# Patient Record
Sex: MALE | Race: WHITE | Employment: FULL TIME | URBAN - METROPOLITAN AREA
[De-identification: names, ages, dates, MRNs, and addresses within clinical notes are randomized per-mention and may not be internally consistent; named-entity substitution may affect disease eponyms.]

---

## 2017-09-14 ENCOUNTER — OFFICE VISIT (OUTPATIENT)
Dept: FAMILY MEDICINE CLINIC | Age: 42
End: 2017-09-14
Payer: COMMERCIAL

## 2017-09-14 VITALS
BODY MASS INDEX: 26.96 KG/M2 | HEART RATE: 61 BPM | TEMPERATURE: 98.2 F | WEIGHT: 182 LBS | SYSTOLIC BLOOD PRESSURE: 128 MMHG | HEIGHT: 69 IN | DIASTOLIC BLOOD PRESSURE: 79 MMHG | RESPIRATION RATE: 16 BRPM

## 2017-09-14 DIAGNOSIS — J30.9 ALLERGIC RHINITIS, UNSPECIFIED ALLERGIC RHINITIS TRIGGER, UNSPECIFIED RHINITIS SEASONALITY: ICD-10-CM

## 2017-09-14 DIAGNOSIS — Z76.89 ENCOUNTER TO ESTABLISH CARE WITH NEW DOCTOR: Primary | ICD-10-CM

## 2017-09-14 DIAGNOSIS — Z23 NEED FOR TDAP VACCINATION: ICD-10-CM

## 2017-09-14 DIAGNOSIS — R09.81 NASAL CONGESTION: ICD-10-CM

## 2017-09-14 DIAGNOSIS — Z13.220 SCREENING CHOLESTEROL LEVEL: ICD-10-CM

## 2017-09-14 DIAGNOSIS — Z13.1 SCREENING FOR DIABETES MELLITUS: ICD-10-CM

## 2017-09-14 DIAGNOSIS — M54.2 CERVICAL PAIN (NECK): ICD-10-CM

## 2017-09-14 PROCEDURE — 90471 IMMUNIZATION ADMIN: CPT | Performed by: NURSE PRACTITIONER

## 2017-09-14 PROCEDURE — 90715 TDAP VACCINE 7 YRS/> IM: CPT | Performed by: NURSE PRACTITIONER

## 2017-09-14 PROCEDURE — 99386 PREV VISIT NEW AGE 40-64: CPT | Performed by: NURSE PRACTITIONER

## 2017-09-14 RX ORDER — METAXALONE 800 MG/1
800 TABLET ORAL 3 TIMES DAILY
Qty: 30 TABLET | Refills: 0 | Status: SHIPPED | OUTPATIENT
Start: 2017-09-14 | End: 2017-09-24

## 2017-09-14 RX ORDER — FLUTICASONE PROPIONATE 50 MCG
1 SPRAY, SUSPENSION (ML) NASAL 2 TIMES DAILY
COMMUNITY

## 2017-09-14 RX ORDER — MELOXICAM 15 MG/1
15 TABLET ORAL DAILY
Qty: 30 TABLET | Refills: 3 | Status: SHIPPED | OUTPATIENT
Start: 2017-09-14 | End: 2018-02-13 | Stop reason: ALTCHOICE

## 2017-09-14 ASSESSMENT — ENCOUNTER SYMPTOMS
RHINORRHEA: 0
ABDOMINAL PAIN: 0
COUGH: 0
BACK PAIN: 0
CONSTIPATION: 0
DIARRHEA: 0
VOMITING: 0
NAUSEA: 0
SHORTNESS OF BREATH: 0

## 2017-09-14 ASSESSMENT — PATIENT HEALTH QUESTIONNAIRE - PHQ9
SUM OF ALL RESPONSES TO PHQ9 QUESTIONS 1 & 2: 0
SUM OF ALL RESPONSES TO PHQ QUESTIONS 1-9: 0
1. LITTLE INTEREST OR PLEASURE IN DOING THINGS: 0
2. FEELING DOWN, DEPRESSED OR HOPELESS: 0

## 2017-10-09 DIAGNOSIS — M54.2 CERVICAL PAIN (NECK): ICD-10-CM

## 2017-10-09 LAB
ALBUMIN SERPL-MCNC: 4.3 G/DL
ALP BLD-CCNC: 75 U/L
ALT SERPL-CCNC: 22 U/L
ANION GAP SERPL CALCULATED.3IONS-SCNC: NORMAL MMOL/L
AST SERPL-CCNC: 16 U/L
BILIRUB SERPL-MCNC: 1 MG/DL (ref 0.1–1.4)
BUN BLDV-MCNC: 12 MG/DL
CALCIUM SERPL-MCNC: 8.6 MG/DL
CHLORIDE BLD-SCNC: 106 MMOL/L
CHOLESTEROL, TOTAL: 131 MG/DL
CHOLESTEROL/HDL RATIO: 4.2
CO2: 27 MMOL/L
CREAT SERPL-MCNC: 0.89 MG/DL
GFR CALCULATED: >60
GLUCOSE BLD-MCNC: 89 MG/DL
HDLC SERPL-MCNC: 31 MG/DL (ref 35–70)
LDL CHOLESTEROL CALCULATED: 68 MG/DL (ref 0–160)
POTASSIUM SERPL-SCNC: 3.8 MMOL/L
SODIUM BLD-SCNC: 142 MMOL/L
TOTAL PROTEIN: 6.2
TRIGL SERPL-MCNC: 161 MG/DL
VLDLC SERPL CALC-MCNC: 32 MG/DL

## 2017-10-10 ENCOUNTER — HOSPITAL ENCOUNTER (OUTPATIENT)
Dept: PHYSICAL THERAPY | Facility: CLINIC | Age: 42
Setting detail: THERAPIES SERIES
Discharge: HOME | End: 2017-10-10
Payer: COMMERCIAL

## 2017-10-10 DIAGNOSIS — Z13.1 SCREENING FOR DIABETES MELLITUS: ICD-10-CM

## 2017-10-10 DIAGNOSIS — Z13.220 SCREENING CHOLESTEROL LEVEL: ICD-10-CM

## 2017-10-10 DIAGNOSIS — Z76.89 ENCOUNTER TO ESTABLISH CARE WITH NEW DOCTOR: ICD-10-CM

## 2017-10-10 PROBLEM — E78.6 LOW HDL (UNDER 40): Status: ACTIVE | Noted: 2017-10-10

## 2017-10-10 PROCEDURE — 97110 THERAPEUTIC EXERCISES: CPT

## 2017-10-10 PROCEDURE — 97140 MANUAL THERAPY 1/> REGIONS: CPT

## 2017-10-10 PROCEDURE — 97161 PT EVAL LOW COMPLEX 20 MIN: CPT

## 2017-10-10 NOTE — CONSULTS
Rise/Sit    []Stand    [] Walk    [x] Lying    [] Bend                             [] Valsalva    [] Other:  Sleep: [] OK    [x] Disturbed    Objective:      STRENGTH  STRENGTH  ROM    Left Right  Left Right Cervical tight   C5 Shld Abd 5 5 L1-2 Hip Flex   Flexion 55   Shld Flexion 5 5 Hip Abd   Extension WNL   Shld IR 5 5 L3-4 Knee Ext   Rotation L 50 R 50   Shld ER 5 5 L4 Ankle DF   Sidebend L 30 R 25   C6 Elb Flex 5 5 L5 EHL   Retraction    C7 Elb Ext 5 5 S1 Plant. Flex   Lumbar    C8 EPL 5 5 Abdominals   Flexion    T1 Fing Abd   Erector Spinae   Extension          Rotation L  R         Sidebend L R         UE/LE WNL WNL                                                            Post ribT4 R side, will further assess rib function as able    TESTS (+/-) LEFT RIGHT Not Tested   SLR [] sit [] supine   []   Hamstring (SLR)   []   SKTC   []   DKTC   []   Slump/Dural   []   SI JT   []   SANTOS   []   Joint Mobility   []   Cerv. Comp - - []   Cerv. Distraction - - []   Cerv. Alar/Transverse   []   Vertebral Artery   []   Adsons   []   Malva [place]   []       OBSERVATION No Deficit Deficit Not Tested Comments   Posture       Forward Head [] [x] [] min   Rounded Shoulders [] [x] [] mod   Kyphosis [] [] []    Lordosis [] [] []    Lateral Shift [] [] []    Scoliosis [] [] []    Iliac Crest [] [] []    PSIS [] [] []    ASIS [] [] []    Genu Valgus [] [] []    Genu Varus [] [] []    Genu Recurvatum [] [] []    Pronation [] [] []    Supination [] [] []    Leg Length Discrp [] [] []    Slumped Sitting [] [x] []    Palpation [] [x] [] Mod muscle tension suboccipitals, UT, levator and  SCM L>R.  Thoracic hypomobility   Sensation [] [x] [] Tingling R UE   Edema [] [] []    Neurological [] [] []          FUNCTION Normal Difficult Unable   Sitting [] [x] []   Standing [] [] []   Ambulation [] [] []   Groom/Dress [] [] []   Lift/Carry [] [] []   Stairs [] [] []   Bending [] [x] []   OH reach [] [] []   Sit to Stand [] [] [] retraction 10x2 3-5 sec    Prone scap depression 10x2 3-5 sec          Cervical retraction 10x 10 sec supine   UT/levator stretch 3x 15 sec    Scalene stretch 3x 15 sec          Seated scap retraction 10x2 3-5 sec Elbows at 90°         Other: Instructed pt in therex per log, proper posture/positioning and use of 2 tennis balls in a sock for self SOR/TP for home. Issued HO for HEP. Pt has a rowing machine and free wts at home. Specific Instructions for next treatment:    Treatment Charges: Mins Units   [x] Evaluation       []  Low       []  Moderate       []  High 25 1   []  Modalities     [x]  Ther Exercise 20 2   [x]  Manual Therapy 20 1   []  Ther Activities     []  Aquatics     []  Vasocompression     []  Other       TOTAL TREATMENT TIME: 65    Time in: 0930   Time out: 1040    Electronically signed by: Zakiya Alamo PT        Physician Signature:________________________________Date:__________________  By signing above or cosigning this note, I have reviewed this plan of care and certify a need for medically necessary rehabilitation services.      *PLEASE SIGN ABOVE AND FAX BACK ALL PAGES*

## 2017-10-12 ENCOUNTER — HOSPITAL ENCOUNTER (OUTPATIENT)
Dept: PHYSICAL THERAPY | Facility: CLINIC | Age: 42
Setting detail: THERAPIES SERIES
End: 2017-10-12
Payer: COMMERCIAL

## 2017-10-16 ENCOUNTER — HOSPITAL ENCOUNTER (OUTPATIENT)
Dept: PHYSICAL THERAPY | Facility: CLINIC | Age: 42
Setting detail: THERAPIES SERIES
Discharge: HOME | End: 2017-10-16
Payer: COMMERCIAL

## 2017-10-16 PROCEDURE — 97140 MANUAL THERAPY 1/> REGIONS: CPT

## 2017-10-16 PROCEDURE — 97110 THERAPEUTIC EXERCISES: CPT

## 2017-10-16 NOTE — FLOWSHEET NOTE
[]        Outpatient Physical        Therapy       [address]       Phone: [phone]       Fax: [phone] [] [facility] Promotion at [address] [facility]       Phone: [phone]       Fax: [phone] [x] [employer]. [address] [place] [address] Health Promotion  [facility]   Phone: [phone]   Fax:  [phone]     Physical Therapy Daily Treatment Note    Date:  10/16/2017  Patient Name:  Yolanda Ochoa    :  1975  MRN: [id number]  Physician: [address] [place] [address] Street: [facility] (40 visits per yr)  Medical Diagnosis: neck pain                                          Rehab Codes: M54.2  Onset Date:    2016           Next 's appt.: 10/26/17  Visit# / total visits: 2/12  Cancels/No Shows: 1cx    Subjective:  Pt states the day after his evaluation was terrible was nauseous, had sinus flare up and stayed home w/ HA. Notes the next day was better and has had less pain since. States he made a few adjustments at home and more aware of posture/positioning.   Pain:  [x] Yes  [] No Location: cervical/thoracic                Pain Rating: (0-10 scale) 1-2/10  Pain altered Tx:  [x] No  [] Yes  Action:  Comments:    Objective:  Modalities: prn  Precautions:  Exercises:Manual-SOR,  MFR/TPR to UT, SCM, scalenes and prone thoracic PA glides-prn  Exercise Reps/ Time Weight/ Level Comments   UBE/bike                Prone scap retraction 10x2 3-5 sec     Prone scap depression 10x2 3-5 sec     SL ER   Next             Cervical retraction 10x 10 sec supine   UT/levator stretch 3x 15 sec     Scalene stretch 3x 15 sec               Seated scap retraction 10x2 3-5 sec Elbows at 90°             Door pec stretch 3x ea 20 sec Varied arm position   UE tband: ext, row, ER 15x ea red          Other:    Specific Instructions for next treatment: progress tband and issue tband and HO for HEP    Treatment Charges:

## 2017-10-20 ENCOUNTER — HOSPITAL ENCOUNTER (OUTPATIENT)
Dept: PHYSICAL THERAPY | Facility: CLINIC | Age: 42
Setting detail: THERAPIES SERIES
Discharge: HOME | End: 2017-10-20
Payer: COMMERCIAL

## 2017-10-20 PROCEDURE — 97110 THERAPEUTIC EXERCISES: CPT

## 2017-10-20 PROCEDURE — 97140 MANUAL THERAPY 1/> REGIONS: CPT

## 2017-10-24 ENCOUNTER — HOSPITAL ENCOUNTER (OUTPATIENT)
Dept: PHYSICAL THERAPY | Facility: CLINIC | Age: 42
Setting detail: THERAPIES SERIES
Discharge: HOME | End: 2017-10-24
Payer: COMMERCIAL

## 2017-10-24 PROCEDURE — 97110 THERAPEUTIC EXERCISES: CPT

## 2017-10-24 NOTE — FLOWSHEET NOTE
[] Eduard Scott       Outpatient Physical        Therapy       [address]       Phone: [phone]       Fax: [phone] [] [facility] at [address]       Phone: [phone]       Fax: [phone] [x] [place]. [address] [place] [address] Promotion  [address] [place] [address]   Phone: [phone]   Fax:  [phone]     Physical Therapy Daily Treatment Note    Date:  10/24/2017  Patient Name:  Radha Vital    :  1975  MRN: [id number]  Physician: [address] [place] [address] Street: [facility] (40 visits per yr)  Medical Diagnosis: neck pain                                          Rehab Codes: M54.2  Onset Date:    2016             Next 's appt.: 10/26/17  Visit# / total visits: 4/12  Cancels/No Shows: 1/0    Subjective:    Pain:  [] Yes  [x] No Location: cervical/thoracic                Pain Rating: (0-10 scale) 0/10  Pain altered Tx:  [x] No  [] Yes  Action:  Comments: Pt reports his neck is \"doing good\" and he has no pain or soreness currently. Pt states last night after performing HEP he has a mild increase in soreness in R middle trap which was relieved by application of a hot pack.      Objective:  Modalities: prn  Precautions:  Exercises:Manual-SOR,  MFR/TPR to UT, SCM, scalenes and prone thoracic PA glides-prn  Exercise Reps/ Time Weight/ Level Comments    UBE 8\" L4  x          Doorway Pec Stretch 3x30\" ea  Major, minor x   MAT          Prone scap retraction 20x3\"    x   Prone scap depression 20x3\"    x   SL ER 20x   x   1/2 roll stretch  3'   x   Cervical retraction 10x10\"  supine x   UT/levator stretch 3x30\"    x   Scalene stretch 3x30\"    x              Seated scap retraction 20x3\"  Elbows at 90° x              Door pec stretch 3x20\" ea  Varied arm position x   UE tband:        Rows 25x Red  x   ER 25x Red  x   Shd Extension 25x Red  x   HAB 25x Red  x   Lats 25x Red  x Other:    Specific Instructions for next treatment: Progress as katina. Treatment Charges: Mins Units   []  Modalities     [x]  Ther Exercise 30 2   []  Manual Therapy     []  Ther Activities     []  Aquatics     []  Vasocompression     []  Other     Total Treatment time 30 2       Assessment: [x] Progressing toward goals. [] No change. [x] Other: Progressed pt with increased reps with good pt tolerance. Min v/c for recall of exercises with good carryover to pt. Min tactile cueing during scapular retractions to promote muscular contraction with good carryover to pt. Pt noted feeling like he \"worked\" but had no increase in pain upon completion of therapy. STG/LTG: (to be met in 12 treatments)  1. ? Pain: 2-3/10 at worst, decr frequency/intensity of HAs  2. ? ROM: cervical spine WNL  3. ? Strength: Pt to demo improved postural awareness and stability  4. ? Function: Able to sleep through the night w/o incr pain  5. Independent with Home Exercise Programs    Pt. Education:  [x] Yes  [] No  [] Reviewed Prior HEP/Ed  Method of Education: [x] Verbal  [] Demo  [] Written  Comprehension of Education:  [x] Verbalizes understanding. [x] Demonstrates understanding. [] Needs review. [] Demonstrates/verbalizes HEP/Ed previously given. Plan: [x] Continue per plan of care.    [] Other:      Time In: 7:30am            Time Out: 8:20am    Electronically signed by:  Carrington Garcia PTA

## 2017-10-27 ENCOUNTER — HOSPITAL ENCOUNTER (OUTPATIENT)
Dept: PHYSICAL THERAPY | Facility: CLINIC | Age: 42
Setting detail: THERAPIES SERIES
Discharge: HOME | End: 2017-10-27
Payer: COMMERCIAL

## 2017-10-27 PROCEDURE — 97110 THERAPEUTIC EXERCISES: CPT

## 2017-10-27 NOTE — FLOWSHEET NOTE
[] Laurel Lewis       Outpatient Physical        Therapy       [address]       Phone: [phone]       Fax: [phone] [] [facility] at [address]       Phone: [phone]       Fax: [phone] [x] [facility] [place]. [address] [place] [address]   Phone: [phone]   Fax:  [phone]     Physical Therapy Daily Treatment Note    Date:  10/27/2017  Patient Name:  Olivia Camp    :  1975  MRN: [id number]  Physician: [address] [place] [address] Street: [facility] (40 visits per yr)  Medical Diagnosis: neck pain                                          Rehab Codes: M54.2  Onset Date:    2016             Next 's appt.: 10/26/17  Visit# / total visits:   Cancels/No Shows: 1/0    Subjective:    Pain:  [x] Yes  [x] No Location: cervical/thoracic/ B UT                Pain Rating: (0-10 scale) 1/10  Pain altered Tx:  [x] No  [] Yes  Action:  Comments: Patient reports feeling soreness upon waking every morning and starting to wonder if he needs a  New pillow. Pain 3/10 in the am but goes down to 1/10 after a hot shower.       Objective:  Modalities: prn  Precautions:  Exercises:Manual-SOR,  MFR/TPR to UT, SCM, scalenes and prone thoracic PA glides-prn  Exercise Reps/ Time Weight/ Level Comments    UBE 8\" L4  x          Doorway Pec Stretch 3x30\" ea  Major, minor x   MAT          Prone scap retraction 20x3\"    x   Prone scap depression 20x3\"    x   SL ER 20x  Added 1 lb DB 10.27.17 x   1/2 roll stretch  3'      Cervical retraction 10x10\"  Seated x   UT/levator stretch 3x30\"    x   Scalene stretch 3x30\"    x              Seated scap retraction 20x3\"  Elbows at 90°               Door pec stretch 3x20\" ea  Varied arm position x   UE tband:        Rows 20x Green Physio ball added 10.27.17 x   ER 20x Green  x   Shd Extension 20x Green  x   HAB 20x Green Physio ball added

## 2017-10-31 ENCOUNTER — HOSPITAL ENCOUNTER (OUTPATIENT)
Dept: PHYSICAL THERAPY | Facility: CLINIC | Age: 42
Setting detail: THERAPIES SERIES
Discharge: HOME | End: 2017-10-31
Payer: COMMERCIAL

## 2017-10-31 PROCEDURE — 97110 THERAPEUTIC EXERCISES: CPT

## 2017-10-31 NOTE — FLOWSHEET NOTE
[] Collette Fernandez       Outpatient Physical        Therapy       [address]       Phone: [phone]       Fax: [phone] [] [facility] for Health Promotion at [address] [facility]       Phone: [phone]       Fax: [phone] [x] [facility] for Health Promotion  [address] [facility]   Phone: [phone]   Fax:  [phone]     Physical Therapy Daily Treatment Note    Date:  10/31/2017  Patient Name:  Radha Gatica    :  1975  MRN: [id number]  Physician: [address] [place] [address] Street: [facility] (40 visits per yr)  Medical Diagnosis: neck pain                                          Rehab Codes: M54.2  Onset Date:    2016             Next 's appt.: 10/26/17  Visit# / total visits: /12  Cancels/No Shows: 1/0    Subjective:    Pain:  [x] Yes  [x] No Location: cervical/thoracic/ B UT                Pain Rating: (0-10 scale) 0/10  Pain altered Tx:  [x] No  [] Yes  Action:  Comments: Pt reports no pain or discomfort in cervical region. Pt states he has been performing cervical retraction periodically throughout the day which he believes is helping. Objective:  Modalities: prn  Precautions:  Exercises:Manual-SOR,  MFR/TPR to UT, SCM, scalenes and prone thoracic PA glides-prn  Exercise Reps/ Time Weight/ Level Comments    UBE 8' L4  x          Doorway Pec Stretch 3x30\" ea  Major, minor x   MAT          Prone scap retraction 20x3\"    x   Prone scap depression 20x3\"    x   SL ER 25x 2# Bilat x   1/2 roll stretch  3'   x   Cervical retraction 10x10\"  Seated x   UT/levator stretch 3x30\"    x   Scalene stretch 3x30\"    x   Prone I-Y-T 10x     x   Seated scap retraction 20x3\"  Elbows at 90° x   UE tband:        Rows 25x Green  x   ER 25x Green  x   Shd Extension 25x Green  x   HAB 25x Green  x   Lats 25x Green  x   Other:    Specific Instructions for next treatment: Progress as katina.        Treatment Charges: Mins Units   []  Modalities     [x]  Ther Exercise 45 3   []  Manual Therapy     []  Ther Activities     []  Aquatics     []  Vasocompression     []  Other     Total Treatment time 45 3       Assessment: [x] Progressing toward goals. [] No change. [x] Other:  Progressed pt with addition of prone I-Y-T in order to improve scapular strength for increased cervical stabilization and progressed with increased reps. Good pt tolerance with progressions. Min v/c and demonstration required for proper technique of new exercises and to avoid compensation with UT elevation with good carryover to pt. Pt noted feeling a slight increase in muscular fatigue upon completion of therapy but had no increase in pain. STG/LTG: (to be met in 12 treatments)  1. ? Pain: 2-3/10 at worst, decr frequency/intensity of HAs  2. ? ROM: cervical spine WNL  3. ? Strength: Pt to demo improved postural awareness and stability  4. ? Function: Able to sleep through the night w/o incr pain  5. Independent with Home Exercise Programs    Pt. Education:  [x] Yes  [] No  [] Reviewed Prior HEP/Ed  Method of Education: [x] Verbal  [] Demo  [] Written  Comprehension of Education:  [x] Verbalizes understanding. [x] Demonstrates understanding. [] Needs review. [] Demonstrates/verbalizes HEP/Ed previously given. Patient states he is compliant with HEP     Plan: [x] Continue per plan of care.    [] Other:      Time In: 7:30am            Time Out: 8:35am    Electronically signed by:  CHANTELLE Anthony

## 2017-11-02 ENCOUNTER — HOSPITAL ENCOUNTER (OUTPATIENT)
Dept: PHYSICAL THERAPY | Facility: CLINIC | Age: 42
Setting detail: THERAPIES SERIES
Discharge: HOME | End: 2017-11-02
Payer: COMMERCIAL

## 2017-11-02 PROCEDURE — 97110 THERAPEUTIC EXERCISES: CPT

## 2017-11-02 NOTE — FLOWSHEET NOTE
[] [facility]       Outpatient Physical        Therapy       [address]       Phone: [phone]       Fax: [phone] [] [facility] for Health Promotion at [address] [place] [address]       Phone: [phone]       Fax: [phone] [x] [facility] for Health Promotion  [address]   Phone: [phone]   Fax:  [phone]     Physical Therapy Daily Treatment Note    Date:  2017  Patient Name:  Rush Meadows    :  1975  MRN: [id number]  Physician: [address] [place] [address] Street: [facility] (40 visits per yr)  Medical Diagnosis: neck pain                                          Rehab Codes: M54.2  Onset Date:    2016             Next 's appt.: 10/26/17  Visit# / total visits: /  Cancels/No Shows: 1/0    Subjective:    Pain:  [x] Yes  [x] No Location: cervical/thoracic/ B UT                Pain Rating: (0-10 scale) 0/10  Pain altered Tx:  [x] No  [] Yes  Action:  Comments: Pt reports no pain or discomfort in cervical region and no complications post last tx. He stated has been compliant in stretches every day and TB ex every other. Objective:  Modalities: prn  Precautions:  Exercises:Manual-SOR,  MFR/TPR to UT, SCM, scalenes and prone thoracic PA glides-prn  Exercise Reps/ Time Weight/ Level Comments    UBE 8' L4  x          Doorway Pec Stretch 3x30\" ea  Major, minor x   MAT          Prone scap retraction 20x3\"    x   Prone scap depression 20x3\"    x   SL ER 25x 2# Bilat x   1/2 roll stretch  3'   x   Cervical retraction 10x10\"  Seated x   UT/levator stretch 3x30\"    x   Scalene stretch 3x30\"    x   Prone I-Y-T 15x     x   Seated scap retraction 20x3\"  Elbows at 90° x   UE tband:        Rows 25x Green Increase to blue next tx x   ER 25x Green  x   Shd Extension 25x Green  x   HAB 25x Green  x   Lats 25x Green  x   Other:    Specific Instructions for next treatment: Progress as katina. Treatment Charges: Mins Units   []  Modalities     [x]  Ther Exercise 45 3   []  Manual Therapy     []  Ther Activities     []  Aquatics     []  Vasocompression     []  Other     Total Treatment time 45 3       Assessment: [x] Progressing toward goals. [] No change. [x] Other: Increased prone reps with tactile/vc needed to maintain technique with min mm fatigue. Pt demonstrates improved scapula ex katina and discussed progressions next tx. Pt is scheduled for remaining visits, including re-eval with primary therapist.        STG/LTG: (to be met in 12 treatments)  1. ? Pain: 2-3/10 at worst, decr frequency/intensity of HAs  2. ? ROM: cervical spine WNL  3. ? Strength: Pt to demo improved postural awareness and stability  4. ? Function: Able to sleep through the night w/o incr pain  5. Independent with Home Exercise Programs    Pt. Education:  [x] Yes  [] No  [] Reviewed Prior HEP/Ed  Method of Education: [x] Verbal  [] Demo  [] Written  Comprehension of Education:  [x] Verbalizes understanding. [x] Demonstrates understanding. [] Needs review. [] Demonstrates/verbalizes HEP/Ed previously given. Patient states he is compliant with HEP     Plan: [x] Continue per plan of care.    [] Other:      Time In: 7:30am            Time Out: 8:30am    Electronically signed by:  Medardo Thakkar PTA

## 2017-11-07 ENCOUNTER — HOSPITAL ENCOUNTER (OUTPATIENT)
Dept: PHYSICAL THERAPY | Facility: CLINIC | Age: 42
Setting detail: THERAPIES SERIES
Discharge: HOME | End: 2017-11-07
Payer: COMMERCIAL

## 2017-11-07 PROCEDURE — 97110 THERAPEUTIC EXERCISES: CPT

## 2017-11-08 ENCOUNTER — OFFICE VISIT (OUTPATIENT)
Dept: FAMILY MEDICINE CLINIC | Age: 42
End: 2017-11-08
Payer: COMMERCIAL

## 2017-11-08 VITALS
HEART RATE: 64 BPM | WEIGHT: 184 LBS | SYSTOLIC BLOOD PRESSURE: 115 MMHG | RESPIRATION RATE: 16 BRPM | BODY MASS INDEX: 27.17 KG/M2 | DIASTOLIC BLOOD PRESSURE: 73 MMHG

## 2017-11-08 DIAGNOSIS — M54.2 CERVICAL PAIN (NECK): Primary | ICD-10-CM

## 2017-11-08 DIAGNOSIS — K21.9 GASTROESOPHAGEAL REFLUX DISEASE, ESOPHAGITIS PRESENCE NOT SPECIFIED: ICD-10-CM

## 2017-11-08 DIAGNOSIS — R51.9 GENERALIZED HEADACHES: ICD-10-CM

## 2017-11-08 DIAGNOSIS — E78.6 LOW HDL (UNDER 40): ICD-10-CM

## 2017-11-08 PROCEDURE — G8419 CALC BMI OUT NRM PARAM NOF/U: HCPCS | Performed by: NURSE PRACTITIONER

## 2017-11-08 PROCEDURE — 1036F TOBACCO NON-USER: CPT | Performed by: NURSE PRACTITIONER

## 2017-11-08 PROCEDURE — 99214 OFFICE O/P EST MOD 30 MIN: CPT | Performed by: NURSE PRACTITIONER

## 2017-11-08 PROCEDURE — G8427 DOCREV CUR MEDS BY ELIG CLIN: HCPCS | Performed by: NURSE PRACTITIONER

## 2017-11-08 PROCEDURE — G8484 FLU IMMUNIZE NO ADMIN: HCPCS | Performed by: NURSE PRACTITIONER

## 2017-11-08 RX ORDER — BUTALBITAL, ACETAMINOPHEN AND CAFFEINE 50; 325; 40 MG/1; MG/1; MG/1
1 TABLET ORAL EVERY 6 HOURS PRN
Qty: 30 TABLET | Refills: 0 | Status: SHIPPED | OUTPATIENT
Start: 2017-11-08 | End: 2018-07-24

## 2017-11-08 NOTE — PROGRESS NOTES
Subjective:  Rick Barnes is in for continued evaluation and management. His medical problems include the following; cervical neck pain, headaches, low HDL and GERD. He has been having issues with cervical neck pain rating up to his posterior head he had cervical neck x-ray completed which was negative. He has been taking Skelaxin as needed at night. He hasn't found physical therapy through the physical therapy has helped immensely with the discomfort. He denies numbness/tingling at this time. Denies bowel/bladder incontinence or saddle anesthesia. He had been having some intermittent headaches he states that had one episode since his last office visit he did try meloxicam is helping to get nauseous with this and he states he had a cough work. He denies vision changes or numbness/tingling. He has also been seen ENT for nasal congestion issues. He was found to have low HDL and his most recent lab work is not currently a statin benefit group. He denies chest pain or pressure. He's had long standing history of acid reflux he'll intermittently take PPIs he states for the past 6 months he was taking Pepcid AC although recently he's been under some increased stress at work he is switched back to over-the-counter Prilosec he states this has been helping. Denies any current abdominal pain. Review of systems per HPI, otherwise negative. Allergies; medications; past medical, surgical, family, and social history; and problem list reviewed as indicated in this encounter. Objective:  Vitals: Blood pressure 115/73, pulse 64, resp. rate 16, weight 184 lb (83.5 kg). Constitutional: He is oriented to person, place, and time. He appears well-developed and well-nourished and in no acute distress. Cardiovascular: Normal rate and regular rhythm, no murmur, rub, or gallop    Pulmonary/Chest: Effort normal and breath sounds normal. No rales or wheezes. No chest retraction.   Abdomen: Soft, elevated triglycerides. Continue to monitor. Acid refluxthis time he is starting Prilosec I'm encouraged him to multivitamin daily provided information regards to GERD. Informed if he is on long-term PPI treatment would consider additional lab work. Follow up 3-6 months depending on symptoms, sooner if needed. Nadine Essex received counseling on the following healthy behaviors: nutrition, exercise and medication adherence    Patient given educational materials on  GERD and Fioricet    Was a self-tracking handout given in paper form or via [employer]? No  If yes, see orders or list here. Discussed use, benefit, and side effects of prescribed medications. Barriers to medication compliance addressed. All patient questions answered. Pt voiced understanding. This note is created with the assistance of a speech-recognition program.  While intending to generate a document that actually reflects the content of the visit, no guarantees can be provided that every mistake has been identified and corrected by editing. Of the 25 minute duration appointment visit, [facility] spent at least 50% of the face-to-face time in counseling, explanation of diagnosis, planning of further management, and answering all questions.

## 2017-11-09 ENCOUNTER — HOSPITAL ENCOUNTER (OUTPATIENT)
Dept: PHYSICAL THERAPY | Facility: CLINIC | Age: 42
Setting detail: THERAPIES SERIES
Discharge: HOME | End: 2017-11-09
Payer: COMMERCIAL

## 2017-11-09 PROCEDURE — 97110 THERAPEUTIC EXERCISES: CPT

## 2017-11-09 NOTE — FLOWSHEET NOTE
[] Tennis Jefferson       Outpatient Physical        Therapy       [address]       Phone: [phone]       Fax: [phone] [] [facility] for Health Promotion at [address] [facility]       Phone: [phone]       Fax: [phone] [x] [facility] for Health Promotion  [address] [facility]   Phone: [phone]   Fax:  [phone]     Physical Therapy Daily Treatment Note    Date:  2017  Patient Name:  Daisy Hernandez    :  1975  MRN: [id number]  Physician: [address] [place] [address] Street: [facility] (40 visits per yr)  Medical Diagnosis: neck pain                                          Rehab Codes: M54.2  Onset Date:    2016             Next 's appt.: 10/26/17  Visit# / total visits:   Cancels/No Shows: 1/0    Subjective:    Pain:  [x] Yes  [x] No Location: cervical/thoracic/ B UT                Pain Rating: (0-10 scale) 0/10  Pain altered Tx:  [x] No  [] Yes  Action:  Comments: Pt reports no pain or discomfort on arrival.   Objective:  Modalities: prn  Precautions:  Exercises:Manual-SOR,  MFR/TPR to UT, SCM, scalenes and prone thoracic PA glides-prn  Exercise Reps/ Time Weight/ Level Comments    UBE 8' L4 D/C --   Air dyne  8'  Arms only x   Doorway Pec Stretch 3x30\" ea  Major, minor x   MAT          Prone scap retraction 20x3\"    x   Prone scap depression 20x3\"    x   SL ER 25x 3# Bilat x   SL HAB 20x 3# Cuauhtemoc     1/2 roll stretch  3'   x   Cervical retraction 10x10\"  Seated x   UT/levator stretch 3x30\"    x   Scalene stretch 3x30\"    x   Prone I-Y-T 20x    increased reps  x   Prone rows  20x 5# Added     Seated scap retraction 20x3\"  Elbows at 90° x   Prone SB walk out 5x  Added  x                               UE tband:    Increased all reps     Rows 30x blue  x   ER 30x blue  x   Shd Extension 30x blue  x   HAB 25x blue  x   Lats 30x blue  x   D2 20x blue Added  x Other:    Specific Instructions for next treatment: Progress as katina. Treatment Charges: Mins Units   []  Modalities     [x]  Ther Exercise 45 3   []  Manual Therapy     []  Ther Activities     []  Aquatics     []  Vasocompression     []  Other     Total Treatment time 45 3       Assessment: [x] Progressing toward goals. [] No change. [x] Other: Progressed regiment as listed above with min mm fatigue and no cervical pn. Min discomfort in mars wrist but was katina, discussed with pt we can modify next tx if wrist pn continues. Plan on progressing regiment next tx and start preporation for discharge. STG/LTG: (to be met in 12 treatments)  1. ? Pain: 2-3/10 at worst, decr frequency/intensity of HAs  2. ? ROM: cervical spine WNL  3. ? Strength: Pt to demo improved postural awareness and stability  4. ? Function: Able to sleep through the night w/o incr pain  5. Independent with Home Exercise Programs    Pt. Education:  [x] Yes  [] No  [] Reviewed Prior HEP/Ed  Method of Education: [x] Verbal  [] Demo  [] Written  Comprehension of Education:  [x] Verbalizes understanding. [x] Demonstrates understanding. [] Needs review. [] Demonstrates/verbalizes HEP/Ed previously given. Patient states he is compliant with HEP     Plan: [x] Continue per plan of care.    [] Other:      Time In: 7:30am            Time Out: 8:25am    Electronically signed by:  Azalea Mosher PTA

## 2017-11-14 ENCOUNTER — HOSPITAL ENCOUNTER (OUTPATIENT)
Dept: PHYSICAL THERAPY | Facility: CLINIC | Age: 42
Setting detail: THERAPIES SERIES
Discharge: HOME | End: 2017-11-14
Payer: COMMERCIAL

## 2017-11-14 PROCEDURE — 97110 THERAPEUTIC EXERCISES: CPT

## 2017-11-14 NOTE — FLOWSHEET NOTE
Other:    Specific Instructions for next treatment: Progress as katina. Treatment Charges: Mins Units   []  Modalities     [x]  Ther Exercise 45 3   []  Manual Therapy     []  Ther Activities     []  Aquatics     []  Vasocompression     []  Other     Total Treatment time 45 3       Assessment: [x] Progressing toward goals. [] No change. [x] Other: Progressed regiment as listed above with no mm fatigue or cervical pn. Plan on progressing regiment next tx and start preporation for discharge. STG/LTG: (to be met in 12 treatments)  1. ? Pain: 2-3/10 at worst, decr frequency/intensity of HAs  2. ? ROM: cervical spine WNL  3. ? Strength: Pt to demo improved postural awareness and stability  4. ? Function: Able to sleep through the night w/o incr pain  5. Independent with Home Exercise Programs    Pt. Education:  [x] Yes  [] No  [] Reviewed Prior HEP/Ed  Method of Education: [x] Verbal  [] Demo  [] Written  Comprehension of Education:  [x] Verbalizes understanding. [x] Demonstrates understanding. [] Needs review. [] Demonstrates/verbalizes HEP/Ed previously given. Patient states he is compliant with HEP     Plan: [x] Continue per plan of care.    [] Other:      Time In: 7:30am            Time Out: 8:31am    Electronically signed by:  Arvin Holliday PTA

## 2017-11-16 ENCOUNTER — HOSPITAL ENCOUNTER (OUTPATIENT)
Dept: PHYSICAL THERAPY | Facility: CLINIC | Age: 42
Setting detail: THERAPIES SERIES
Discharge: HOME | End: 2017-11-16
Payer: COMMERCIAL

## 2017-11-16 PROCEDURE — 97110 THERAPEUTIC EXERCISES: CPT

## 2017-11-16 NOTE — FLOWSHEET NOTE
5#  x   Seated scap retraction 20x3\"  Elbows at 90° D/C   Prone SB walk out 5x   x   Body blade 2x45\"  Flex/abd, bilat x   Shoulder Shrugs 20x 10#  x   UE tband:        Rows 30x Black  x   ER 30x Black  x   Shd Extension 30x Black  x   HAB 30x Black  x   Lats 30x Black  x   D2 20x Black  x   High row, ER, press 10x Black  x          Other:    Specific Instructions for next treatment: Progress as katina. Treatment Charges: Mins Units   []  Modalities     [x]  Ther Exercise 45 3   []  Manual Therapy     []  Ther Activities     []  Aquatics     []  Vasocompression     []  Other     Total Treatment time 45 3       Assessment: [x] Progressing toward goals. [] No change. [x] Other: Progressed pt with increased t-band resistance and addition of high row with ER and overhead press with good pt tolerance. Min v/c and demonstration required for proper technique of new exercises and to avoid compensation with shoulder elevation during t-band exercises with good carryover to pt. Min v/c for core engagement during physioball walk outs with good carryover to pt. Pt noted feeling increased muscular fatigue upon completion of therapy but had no increase in pain/tenderness in cervical region. STG/LTG: (to be met in 12 treatments)  1. ? Pain: 2-3/10 at worst, decr frequency/intensity of HAs  2. ? ROM: cervical spine WNL  3. ? Strength: Pt to demo improved postural awareness and stability  4. ? Function: Able to sleep through the night w/o incr pain  5. Independent with Home Exercise Programs    Pt. Education:  [x] Yes  [] No  [x] Reviewed Prior HEP/Ed: Provided pt with black t-band    Method of Education: [x] Verbal  [x] Demo  [x] Written: 11/16/17-copy in chart   Comprehension of Education:  [x] Verbalizes understanding. [x] Demonstrates understanding. [] Needs review. [x] Demonstrates/verbalizes HEP/Ed previously given. Plan: [x] Continue per plan of care.     [] Other:      Time In: 7:30am            Time Out: 8:37am    Electronically signed by:  Delmi James PTA

## 2017-11-21 ENCOUNTER — HOSPITAL ENCOUNTER (OUTPATIENT)
Dept: PHYSICAL THERAPY | Facility: CLINIC | Age: 42
Setting detail: THERAPIES SERIES
Discharge: HOME | End: 2017-11-21
Payer: COMMERCIAL

## 2017-11-21 PROCEDURE — 97530 THERAPEUTIC ACTIVITIES: CPT

## 2017-11-21 PROCEDURE — 97110 THERAPEUTIC EXERCISES: CPT

## 2017-11-21 NOTE — FLOWSHEET NOTE
Black  x   HAB 30x Black  x   Lats 30x Black  x   D2 20x Black  x   High row, ER, press 10x Black  x          Other:    Specific Instructions for next treatment: Progress as katina. Treatment Charges: Mins Units   []  Modalities     [x]  Ther Exercise 45 3   []  Manual Therapy     [x]  Ther Activities 10 1   []  Aquatics     []  Vasocompression     []  Other     Total Treatment time 45 3       Assessment: [x] Progressing toward goals. [] No change. [x] Other: Comp ex log with focus on technique for preparation of discharge. Pt was able to complete full regiment with no vc needed for technique and min for recall. Pt has been given a full updated HEP with understanding of each and will cont through independent program.     STG/LTG: (to be met in 12 treatments)  1. ? Pain: 2-3/10 at worst, decr frequency/intensity of HAs-MET  2. ? ROM: cervical spine WNL-MET  3. ? Strength: Pt to demo improved postural awareness and stability-MET  4. ? Function: Able to sleep through the night w/o incr pain-MET  5. Independent with Home Exercise Programs-MET    Pt. Education:  [x] Yes  [] No  [x] Reviewed Prior HEP/Ed: Provided pt with black t-band    Method of Education: [x] Verbal  [x] Demo  [x] Written: 11/16/17-copy in chart   Comprehension of Education:  [x] Verbalizes understanding. [x] Demonstrates understanding. [] Needs review. [x] Demonstrates/verbalizes HEP/Ed previously given. Plan: [] Continue per plan of care.     [x] Other: Pt has met all goal at this time, will discharge from PT to independent HEP      Time In: 7:30am            Time Out: 8:37am    Electronically signed by:  Kim Julien PTA

## 2018-01-05 ENCOUNTER — TELEPHONE (OUTPATIENT)
Dept: FAMILY MEDICINE CLINIC | Age: 43
End: 2018-01-05

## 2018-01-05 RX ORDER — AMOXICILLIN AND CLAVULANATE POTASSIUM 875; 125 MG/1; MG/1
1 TABLET, FILM COATED ORAL 2 TIMES DAILY
Qty: 20 TABLET | Refills: 0 | Status: SHIPPED | OUTPATIENT
Start: 2018-01-05 | End: 2018-01-15

## 2018-02-13 ENCOUNTER — OFFICE VISIT (OUTPATIENT)
Dept: FAMILY MEDICINE CLINIC | Age: 43
End: 2018-02-13
Payer: COMMERCIAL

## 2018-02-13 VITALS
WEIGHT: 180 LBS | BODY MASS INDEX: 26.58 KG/M2 | SYSTOLIC BLOOD PRESSURE: 132 MMHG | RESPIRATION RATE: 16 BRPM | HEART RATE: 68 BPM | DIASTOLIC BLOOD PRESSURE: 84 MMHG

## 2018-02-13 DIAGNOSIS — K21.9 GASTROESOPHAGEAL REFLUX DISEASE, ESOPHAGITIS PRESENCE NOT SPECIFIED: Primary | ICD-10-CM

## 2018-02-13 DIAGNOSIS — R09.81 NASAL CONGESTION: ICD-10-CM

## 2018-02-13 DIAGNOSIS — R51.9 GENERALIZED HEADACHES: ICD-10-CM

## 2018-02-13 DIAGNOSIS — M54.2 CERVICAL PAIN (NECK): ICD-10-CM

## 2018-02-13 PROCEDURE — 99214 OFFICE O/P EST MOD 30 MIN: CPT | Performed by: NURSE PRACTITIONER

## 2018-02-13 PROCEDURE — G8427 DOCREV CUR MEDS BY ELIG CLIN: HCPCS | Performed by: NURSE PRACTITIONER

## 2018-02-13 PROCEDURE — G8419 CALC BMI OUT NRM PARAM NOF/U: HCPCS | Performed by: NURSE PRACTITIONER

## 2018-02-13 PROCEDURE — G8482 FLU IMMUNIZE ORDER/ADMIN: HCPCS | Performed by: NURSE PRACTITIONER

## 2018-02-13 PROCEDURE — 1036F TOBACCO NON-USER: CPT | Performed by: NURSE PRACTITIONER

## 2018-02-13 NOTE — PROGRESS NOTES
The patient maintains appropriate eye contact and does not appear to be responding to internal stimuli. No agitation    Assessment/ Plan / Medical Decision Making  1. Gastroesophageal reflux disease, esophagitis presence not specified     2. Cervical pain (neck)     3. Generalized headaches     4. Nasal congestion             Medications, laboratory testing, imaging, consultation, and follow up as documented in this encounter. GERDhe'll continue his PPI at this time encouraged lifestyle modifications additionally. Encouraged continue multivitamin. With his routine labs in October we discussed checking additional labs including magnesium, vitamin B12 and think along with his BMP. He is agreeable to this. Cervical neck painresolved continue to monitor. Headachesimproved continue to monitor. Nasal congestioncontinue Flonase and Zyrtec follow with ENT as planned. Inform us with any worsening symptoms. Follow up 6 months for chronic issues, sooner if needed. Ajith Amaya received counseling on the following healthy behaviors: medication adherence    Patient given educational materials on prilosec and GERD    Was a self-tracking handout given in paper form or via Televerdehart? No  If yes, see orders or list here. Discussed use, benefit, and side effects of prescribed medications. Barriers to medication compliance addressed. All patient questions answered. Pt voiced understanding. This note is created with the assistance of a speech-recognition program.  While intending to generate a document that actually reflects the content of the visit, no guarantees can be provided that every mistake has been identified and corrected by editing. Of the 25 minute duration appointment visit, Cipriano Gallegos CNP spent at least 50% of the face-to-face time in counseling, explanation of diagnosis, planning of further management, and answering all questions.

## 2018-02-13 NOTE — PATIENT INSTRUCTIONS
proton pump inhibitor that decreases the amount of acid produced in the stomach. Omeprazole is used to treat symptoms of gastroesophageal reflux disease (GERD) and other conditions caused by excess stomach acid. Omeprazole is also used to promote healing of erosive esophagitis (damage to your esophagus caused by stomach acid). Omeprazole may also be given together with antibiotics to treat gastric ulcer caused by infection with helicobacter pylori (H. pylori). Omeprazole is not for immediate relief of heartburn symptoms. Omeprazole may also be used for purposes not listed in this medication guide. What should I discuss with my healthcare provider before taking omeprazole? Heartburn is often confused with the first symptoms of a heart attack. Seek emergency medical attention if you have chest pain or heavy feeling, pain spreading to the arm or shoulder, nausea, sweating, and a general ill feeling. You should not use this medicine if you are allergic to omeprazole or to any benzimidazole medicine such as albendazole or mebendazole. Ask a doctor or pharmacist if it is safe for you to use omeprazole if you have other medical conditions, especially:  · liver disease;  · low levels of magnesium in your blood; or  · osteoporosis or low bone mineral density (osteopenia). Do not use over-the-counter omeprazole (Prilosec OTC) without the advice of a doctor if you have:  · trouble or pain with swallowing;  · bloody or black stools, vomit that looks like blood or coffee grounds;  · heartburn that has lasted for over 3 months;  · frequent chest pain, heartburn with wheezing;  · unexplained weight loss; or  · nausea or vomiting, stomach pain. Taking a proton pump inhibitor such as omeprazole may increase your risk of bone fracture in the hip, wrist, or spine. This effect has occurred mostly in people who have taken the medication long term or at high doses, and in those who are age 48 and older.  It is not clear whether of the reach of children, never share your medicines with others, and use this medication only for the indication prescribed. Every effort has been made to ensure that the information provided by [facility] [address] is accurate, up-to-date, and complete, but no guarantee is made to that effect. Drug information contained herein may be time sensitive. [facility] information has been compiled for use by healthcare practitioners and consumers in the [place] and therefore [facility] does not warrant that uses outside of the [place] are appropriate, unless specifically indicated otherwise. [facility]'s drug information does not endorse drugs, diagnose patients or recommend therapy. [facility]'s drug information is an informational resource designed to assist licensed healthcare practitioners in caring for their patients and/or to serve consumers viewing this service as a supplement to, and not a substitute for, the expertise, skill, knowledge and judgment of healthcare practitioners. The absence of a warning for a given drug or drug combination in no way should be construed to indicate that the drug or drug combination is safe, effective or appropriate for any given patient. [facility] does not assume any responsibility for any aspect of healthcare administered with the aid of information [facility] provides. The information contained herein is not intended to cover all possible uses, directions, precautions, warnings, drug interactions, allergic reactions, or adverse effects. If you have questions about the drugs you are taking, check with your doctor, nurse or pharmacist.  Copyright 2734-6393 [address]: 17.01. Revision date: 2/2/2015. Care instructions adapted under license by [facility] ([facility]). If you have questions about a medical condition or this instruction, always ask your healthcare professional. [place] [address] any warranty or liability for your use of this information.

## 2018-02-20 PROBLEM — J32.1 CHRONIC FRONTAL SINUSITIS: Status: ACTIVE | Noted: 2018-02-20

## 2018-07-24 ENCOUNTER — OFFICE VISIT (OUTPATIENT)
Dept: PRIMARY CARE CLINIC | Age: 43
End: 2018-07-24
Payer: COMMERCIAL

## 2018-07-24 VITALS
RESPIRATION RATE: 16 BRPM | BODY MASS INDEX: 27.32 KG/M2 | WEIGHT: 185 LBS | DIASTOLIC BLOOD PRESSURE: 82 MMHG | HEART RATE: 73 BPM | SYSTOLIC BLOOD PRESSURE: 120 MMHG

## 2018-07-24 DIAGNOSIS — M77.11 LATERAL EPICONDYLITIS OF RIGHT ELBOW: ICD-10-CM

## 2018-07-24 DIAGNOSIS — M25.521 RIGHT ELBOW PAIN: Primary | ICD-10-CM

## 2018-07-24 PROCEDURE — 1036F TOBACCO NON-USER: CPT | Performed by: NURSE PRACTITIONER

## 2018-07-24 PROCEDURE — G8427 DOCREV CUR MEDS BY ELIG CLIN: HCPCS | Performed by: NURSE PRACTITIONER

## 2018-07-24 PROCEDURE — 99213 OFFICE O/P EST LOW 20 MIN: CPT | Performed by: NURSE PRACTITIONER

## 2018-07-24 PROCEDURE — G8419 CALC BMI OUT NRM PARAM NOF/U: HCPCS | Performed by: NURSE PRACTITIONER

## 2018-07-24 RX ORDER — OMEPRAZOLE 10 MG/1
10 CAPSULE, DELAYED RELEASE ORAL DAILY
COMMUNITY

## 2018-07-24 RX ORDER — PREDNISONE 20 MG/1
40 TABLET ORAL DAILY
Qty: 10 TABLET | Refills: 0 | Status: SHIPPED | OUTPATIENT
Start: 2018-07-24 | End: 2018-07-29

## 2018-07-24 ASSESSMENT — ENCOUNTER SYMPTOMS
SHORTNESS OF BREATH: 0
COLOR CHANGE: 0
VOMITING: 0
NAUSEA: 0

## 2018-07-24 NOTE — PATIENT INSTRUCTIONS
Patient Education   Patient Education        Oral Corticosteroids: Care Instructions  Your Care Instructions    Oral corticosteroids are commonly used medicines. They help calm down the body's response to inflammation. Oral means that they are taken by mouth. This is most often in the form of a pill. They are used for treating many conditions. You may take them for asthma, COPD, back pain, or allergic reactions. They are also used for other conditions such as autoimmune diseases and certain types of cancer. You may have side effects from taking this medicine. These include nausea, headache, dizziness, and anxiety. Pregnant women should not take this medicine unless their doctor tells them to. Follow your doctor's instructions on how to take this medicine. If you are taking it for 2 weeks or more, your doctor may give you special instructions to slowly reduce (taper) the amount you take. Slowly cutting down on the medicine over time helps your body adjust to the change. Follow-up care is a key part of your treatment and safety. Be sure to make and go to all appointments, and call your doctor if you are having problems. It's also a good idea to know your test results and keep a list of the medicines you take. How can you care for yourself at home? · Be safe with medicines. Take your medicines exactly as prescribed. Call your doctor if you think you are having a problem with your medicine. You will get more details on the specific medicines your doctor prescribes. · Take your medicine after a meal. It may cause nausea if you take it on an empty stomach. · Avoid taking nonsteroidal anti-inflammatory drugs (NSAIDs) while you are taking oral corticosteroids. Taking both of these medicines might cause an upset stomach. NSAIDs include ibuprofen (Advil, Motrin) and naproxen (Aleve). · If you have a history of stomach ulcers, you may want to avoid taking this medicine and an NSAID at the same time.  This can cause stomach upset or bleeding. · Follow your doctor's instructions for how to stop taking this medicine. You may need to taper it. This means the medicine should be slowly reduced. Do not stop taking the medicine all at once. When should you call for help? Call 911 if:    · You vomit blood or what looks like coffee grounds.    Call your doctor now or seek immediate medical care if:    · Your symptoms are getting worse.     · You are dizzy or lightheaded, or you feel like you may faint.     · You have new or worse nausea or vomiting.     · You have stomach pain that is getting worse.     · Your stools are black.    Watch closely for changes in your health, and be sure to contact your doctor if:    · You do not get better as expected. Where can you learn more? Go to https://Generaytor.ensembli. org and sign in to your [employer] account. Enter Q074 in the Satispay box to learn more about \"Oral Corticosteroids: Care Instructions. \"     If you do not have an account, please click on the \"Sign Up Now\" link. Current as of: December 6, 2017  Content Version: 11.6  © 3585-5026 [employer]. Care instructions adapted under license by [facility] ([facility]). If you have questions about a medical condition or this instruction, always ask your healthcare professional. [address] any warranty or liability for your use of this information. Tennis Elbow: Exercises  Your Care Instructions  Here are some examples of typical rehabilitation exercises for your condition. Start each exercise slowly. Ease off the exercise if you start to have pain. Your doctor or physical therapist will tell you when you can start these exercises and which ones will work best for you. How to do the exercises  Wrist flexor stretch    1. Extend your arm in front of you with your palm up. 2. Bend your wrist, pointing your hand toward the floor.   3. With your other hand, gently bend your wrist learn more? Go to https://chpepiceweb.healthPOTATOSOFT. org and sign in to your ThisClicks account. Enter Q067 in the VoodooVox box to learn more about \"Tennis Elbow: Exercises. \"     If you do not have an account, please click on the \"Sign Up Now\" link. Current as of: November 29, 2017  Content Version: 11.6  © 1617-5389 [employer]. Care instructions adapted under license by [facility] ([facility]). If you have questions about a medical condition or this instruction, always ask your healthcare professional. [address] any warranty or liability for your use of this information.

## 2018-07-24 NOTE — PROGRESS NOTES
Visit Information    Have you changed or started any medications since your last visit including any over-the-counter medicines, vitamins, or herbal medicines? no   Have you stopped taking any of your medications? Is so, why? -  no  Are you having any side effects from any of your medications? - no    Have you seen any other physician or provider since your last visit?  no   Have you had any other diagnostic tests since your last visit?  no   Have you been seen in the emergency room and/or had an admission in a hospital since we last saw you?  no   Have you had your routine dental cleaning in the past 6 months?  yes      Do you have an active MyChart account? If no, what is the barrier?   No    Patient Care Team:  TIA Soto - CNP as PCP - General (Family Nurse Practitioner)    Medical History Review  Past Medical, Family, and Social History reviewed and does contribute to the patient presenting condition    Health Maintenance   Topic Date Due    HIV screen  05/20/1990    Flu vaccine (1) 09/01/2018    Lipid screen  10/09/2022    DTaP/Tdap/Td vaccine (2 - Td) 09/14/2027
use Yes      Comment: ocassionally      Current Outpatient Prescriptions   Medication Sig Dispense Refill    omeprazole (PRILOSEC) 10 MG delayed release capsule Take 10 mg by mouth daily      predniSONE (DELTASONE) 20 MG tablet Take 2 tablets by mouth daily for 5 days 10 tablet 0    fluticasone (FLONASE) 50 MCG/ACT nasal spray 1 spray by Nasal route daily      Cetirizine HCl (ZYRTEC ALLERGY PO) Take by mouth       No current facility-administered medications for this visit. Allergies   Allergen Reactions    Seasonal        Health Maintenance   Topic Date Due    HIV screen  05/20/1990    Flu vaccine (1) 09/01/2018    Lipid screen  10/09/2022    DTaP/Tdap/Td vaccine (2 - Td) 09/14/2027       Subjective:      Review of Systems   Constitutional: Negative for chills, fatigue and fever. Eyes: Negative for visual disturbance. Respiratory: Negative for shortness of breath. Cardiovascular: Negative for chest pain. Gastrointestinal: Negative for nausea and vomiting. Musculoskeletal: Positive for arthralgias. Skin: Negative for color change and rash. Neurological: Negative for dizziness, tingling, syncope, facial asymmetry, speech difficulty, light-headedness, numbness and headaches. Objective:     Physical Exam   Constitutional: He is oriented to person, place, and time. He appears well-developed and well-nourished. No distress. HENT:   Head: Normocephalic and atraumatic. Right Ear: External ear normal.   Left Ear: External ear normal.   Nose: Nose normal.   Mouth/Throat: Oropharynx is clear and moist.   Eyes: EOM are normal. Pupils are equal, round, and reactive to light. Right eye exhibits no discharge. Left eye exhibits no discharge. Neck: Normal range of motion. Neck supple. Cardiovascular: Normal rate, regular rhythm and normal heart sounds. Exam reveals no gallop and no friction rub. No murmur heard. Pulmonary/Chest: Breath sounds normal. No respiratory distress.  He has no

## 2018-07-30 ENCOUNTER — HOSPITAL ENCOUNTER (OUTPATIENT)
Dept: GENERAL RADIOLOGY | Age: 43
Discharge: HOME | End: 2018-08-01
Payer: COMMERCIAL

## 2018-07-30 ENCOUNTER — HOSPITAL ENCOUNTER (OUTPATIENT)
Age: 43
Discharge: HOME | End: 2018-08-01
Payer: COMMERCIAL

## 2018-07-30 DIAGNOSIS — M25.521 RIGHT ELBOW PAIN: ICD-10-CM

## 2018-07-30 PROCEDURE — 73080 X-RAY EXAM OF ELBOW: CPT

## 2021-02-23 ENCOUNTER — OFFICE VISIT (OUTPATIENT)
Dept: PRIMARY CARE CLINIC | Age: 46
End: 2021-02-23
Payer: COMMERCIAL

## 2021-02-23 VITALS
BODY MASS INDEX: 26.77 KG/M2 | SYSTOLIC BLOOD PRESSURE: 140 MMHG | TEMPERATURE: 96.6 F | OXYGEN SATURATION: 98 % | HEART RATE: 77 BPM | WEIGHT: 187 LBS | DIASTOLIC BLOOD PRESSURE: 92 MMHG | HEIGHT: 70 IN

## 2021-02-23 DIAGNOSIS — R68.89 COLD INTOLERANCE: ICD-10-CM

## 2021-02-23 DIAGNOSIS — Z13.1 SCREENING FOR DIABETES MELLITUS: ICD-10-CM

## 2021-02-23 DIAGNOSIS — M77.9 TENDINITIS: ICD-10-CM

## 2021-02-23 DIAGNOSIS — Z00.00 HEALTH CARE MAINTENANCE: Primary | ICD-10-CM

## 2021-02-23 PROCEDURE — G8484 FLU IMMUNIZE NO ADMIN: HCPCS | Performed by: PHYSICIAN ASSISTANT

## 2021-02-23 PROCEDURE — 1036F TOBACCO NON-USER: CPT | Performed by: PHYSICIAN ASSISTANT

## 2021-02-23 PROCEDURE — G8427 DOCREV CUR MEDS BY ELIG CLIN: HCPCS | Performed by: PHYSICIAN ASSISTANT

## 2021-02-23 PROCEDURE — G8419 CALC BMI OUT NRM PARAM NOF/U: HCPCS | Performed by: PHYSICIAN ASSISTANT

## 2021-02-23 PROCEDURE — 99204 OFFICE O/P NEW MOD 45 MIN: CPT | Performed by: PHYSICIAN ASSISTANT

## 2021-02-23 RX ORDER — PREDNISONE 1 MG/1
TABLET ORAL
Qty: 66 TABLET | Refills: 0 | Status: SHIPPED | OUTPATIENT
Start: 2021-02-23 | End: 2022-10-18

## 2021-02-23 SDOH — ECONOMIC STABILITY: FOOD INSECURITY: WITHIN THE PAST 12 MONTHS, YOU WORRIED THAT YOUR FOOD WOULD RUN OUT BEFORE YOU GOT MONEY TO BUY MORE.: NEVER TRUE

## 2021-02-23 ASSESSMENT — ENCOUNTER SYMPTOMS
RHINORRHEA: 0
EYE DISCHARGE: 0
ABDOMINAL DISTENTION: 0
ABDOMINAL PAIN: 0
PHOTOPHOBIA: 0
SINUS PRESSURE: 0
SHORTNESS OF BREATH: 0
COUGH: 0
SORE THROAT: 0
CHEST TIGHTNESS: 0
DIARRHEA: 0
CONSTIPATION: 1
VOMITING: 0

## 2021-02-23 ASSESSMENT — PATIENT HEALTH QUESTIONNAIRE - PHQ9
SUM OF ALL RESPONSES TO PHQ QUESTIONS 1-9: 0
SUM OF ALL RESPONSES TO PHQ9 QUESTIONS 1 & 2: 0
2. FEELING DOWN, DEPRESSED OR HOPELESS: 0

## 2021-02-23 NOTE — PROGRESS NOTES
[address] [facility] PRIMARY CARE  [address]  [facility] [address]  Dept: [phone]    Eliane Thayer is a 39 y.o. male who presents today for his medical conditions/complaintsas noted below. Chief Complaint   Patient presents with   [facility] Patient     get established     Wrist Pain     right wrist pain x4 months     Other     Pt c/o being cold all the time        HPI:     HPI: The patient is a pleasant 45-year-old male who presents with concerns of establishing care and wrist pain. The patient has not seen a primary care provider in quite some time however he does not take very many medications daily. The patient's wrist pain has been going on over about 4 months. Is located to his right wrist along the extensor tendon of his thumb. 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 Patient has tried carpal tunnel splints and ice. Patient is also experiencing cold intolerance and his mother had thyroid cancer. Lastly patient has seasonal allergies for which she takes allergy medicine he also goes to an allergist and has been using sublingual drops for desensitization. Patient states is getting much better. LDL Calculated (mg/dL)   Date Value   10/09/2017 68       (goal LDL is <100)   AST (U/L)   Date Value   10/09/2017 16     ALT (U/L)   Date Value   10/09/2017 22     BUN (mg/dL)   Date Value   10/09/2017 12     BP Readings from Last 3 Encounters:   02/23/21 (!) 140/92   07/24/18 120/82   02/13/18 132/84          (goal 120/80)    History reviewed. No pertinent past medical history.    Past Surgical History:   Procedure Laterality Date    TONSILLECTOMY      VASECTOMY      WISDOM TOOTH EXTRACTION         Family History   Problem Relation Age of Onset    Cancer Mother         thyroid    Heart Disease Father        Social History     Tobacco Use    Smoking status: Never Smoker    Smokeless tobacco: Never Used   Substance Use Topics    Alcohol use: Yes     Comment: 2 glasses of wine per day. Current Outpatient Medications   Medication Sig Dispense Refill    predniSONE (DELTASONE) 5 MG tablet Take 6 tabs bid x3 days, then 3 tabs bid x3 days, then 3 tabs daily x3 days, then 1 tab daily x3 days 66 tablet 0    omeprazole (PRILOSEC) 10 MG delayed release capsule Take 10 mg by mouth daily      fluticasone (FLONASE) 50 MCG/ACT nasal spray 1 spray by Nasal route 2 times daily       Cetirizine HCl (ZYRTEC ALLERGY PO) Take by mouth       No current facility-administered medications for this visit. Allergies   Allergen Reactions    Seasonal        Health Maintenance   Topic Date Due    Hepatitis C screen  1975    HIV screen  05/20/1990    Diabetes screen  05/20/2015    Flu vaccine (1) 09/01/2020    Lipid screen  10/09/2022    DTaP/Tdap/Td vaccine (2 - Td) 09/14/2027    Hepatitis A vaccine  Aged Out    Hepatitis B vaccine  Aged Out    Hib vaccine  Aged Out    Meningococcal (ACWY) vaccine  Aged Out    Pneumococcal 0-64 years Vaccine  Aged Out       Subjective:      Review of Systems   Constitutional: Negative for chills, fever and unexpected weight change. HENT: Negative for congestion, hearing loss, rhinorrhea, sinus pressure and sore throat. Eyes: Positive for visual disturbance (goes to Dr. Wu Rosas needs bifocals soon. ). Negative for photophobia and discharge. Respiratory: Negative for cough, chest tightness and shortness of breath. Cardiovascular: Negative for chest pain, palpitations and leg swelling. Gastrointestinal: Positive for constipation (Occasional Goes away with drinking more water. ). Negative for abdominal distention, abdominal pain, diarrhea and vomiting. Endocrine: Negative for polydipsia and polyuria. Genitourinary: Negative for decreased urine volume, difficulty urinating, frequency and urgency.    Musculoskeletal: Positive for arthralgias and neck pain (he uses exercises for his back due to hunched back at times. ). Negative for gait problem and myalgias. Skin: Negative for rash. Allergic/Immunologic: Negative for food allergies. Neurological: Negative for dizziness, weakness, numbness and headaches. Hematological: Negative for adenopathy. Psychiatric/Behavioral: Positive for sleep disturbance (restless at times). Negative for dysphoric mood. The patient is not nervous/anxious. Objective:     BP (!) 140/92   Pulse 77   Temp 96.6 °F (35.9 °C)   Ht 5' 10\" (1.778 m)   Wt 187 lb (84.8 kg)   SpO2 98%   BMI 26.83 kg/m²   Physical Exam  Constitutional:       General: He is not in acute distress. Appearance: Normal appearance. He is not ill-appearing. HENT:      Head: Normocephalic and atraumatic. Right Ear: Tympanic membrane, ear canal and external ear normal.      Left Ear: Tympanic membrane, ear canal and external ear normal.      Nose: Nose normal.      Mouth/Throat:      Mouth: Mucous membranes are moist.   Eyes:      Extraocular Movements: Extraocular movements intact. Conjunctiva/sclera: Conjunctivae normal.      Pupils: Pupils are equal, round, and reactive to light. Neck:      Musculoskeletal: Normal range of motion and neck supple. Vascular: No carotid bruit. Cardiovascular:      Rate and Rhythm: Normal rate and regular rhythm. Pulses: Normal pulses. Heart sounds: Normal heart sounds. Pulmonary:      Effort: Pulmonary effort is normal. No respiratory distress. Breath sounds: Normal breath sounds. Abdominal:      General: Bowel sounds are normal. There is no distension. Tenderness: There is no abdominal tenderness. Musculoskeletal: Normal range of motion. Lymphadenopathy:      Cervical: No cervical adenopathy. Skin:     General: Skin is warm and dry. Neurological:      General: No focal deficit present. Mental Status: He is alert and oriented to person, place, and time.    Psychiatric:         Mood and Affect: Mood normal.         Behavior: Behavior normal.         Thought Content: Thought content normal.         Assessment:       Diagnosis Orders   1. Health care maintenance  TSH With Reflex Ft4    CBC Auto Differential    Comprehensive Metabolic Panel    Lipid, Fasting    Hemoglobin A1C   2. Tendinitis  predniSONE (DELTASONE) 5 MG tablet   3. Cold intolerance  TSH With Reflex Ft4   4. Screening for diabetes mellitus  Hemoglobin A1C        Plan:       The patient is new to the practice   Elevated BP first occasion due to nervousness   Will check log at home   Continue medication as is. Blood work ordered   Prednisone for wrist pain. Patient educated to use splint for possible de Quervain's tenosynovitis. Recheck blood pressure in 3 weeks. Return for 3 week BP check at nurse visit. .    Orders Placed This Encounter   Procedures    TSH With Reflex Ft4     Standing Status:   Future     Standing Expiration Date:   2/23/2022    CBC Auto Differential     Standing Status:   Future     Standing Expiration Date:   2/24/2022    Comprehensive Metabolic Panel     Standing Status:   Future     Standing Expiration Date:   2/23/2022    Lipid, Fasting     Standing Status:   Future     Standing Expiration Date:   2/23/2022    Hemoglobin A1C     Standing Status:   Future     Standing Expiration Date:   2/23/2022     Orders Placed This Encounter   Medications    predniSONE (DELTASONE) 5 MG tablet     Sig: Take 6 tabs bid x3 days, then 3 tabs bid x3 days, then 3 tabs daily x3 days, then 1 tab daily x3 days     Dispense:  66 tablet     Refill:  0       Patient given educationalmaterials - see patient instructions. Discussed use, benefit, and side effectsof prescribed medications. All patient questions answered. Pt voiced understanding. Reviewed health maintenance. Instructed to continue current medications, diet andexercise. Patient agreed with treatment plan. Follow up as directed.      Electronicallysigned by Nina Jeronimo

## 2021-03-01 ENCOUNTER — HOSPITAL ENCOUNTER (OUTPATIENT)
Age: 46
Setting detail: SPECIMEN
Discharge: HOME | End: 2021-03-01
Payer: COMMERCIAL

## 2021-03-01 DIAGNOSIS — Z13.1 SCREENING FOR DIABETES MELLITUS: ICD-10-CM

## 2021-03-01 DIAGNOSIS — Z00.00 HEALTH CARE MAINTENANCE: ICD-10-CM

## 2021-03-01 DIAGNOSIS — R68.89 COLD INTOLERANCE: ICD-10-CM

## 2021-03-01 LAB
ABSOLUTE EOS #: <0.03 K/UL (ref 0–0.44)
ABSOLUTE IMMATURE GRANULOCYTE: 0.06 K/UL (ref 0–0.3)
ABSOLUTE LYMPH #: 1.25 K/UL (ref 1.1–3.7)
ABSOLUTE MONO #: 0.65 K/UL (ref 0.1–1.2)
ALBUMIN SERPL-MCNC: 4.6 G/DL (ref 3.5–5.2)
ALBUMIN/GLOBULIN RATIO: 1.9 (ref 1–2.5)
ALP BLD-CCNC: 93 U/L (ref 40–129)
ALT SERPL-CCNC: 31 U/L (ref 5–41)
ANION GAP SERPL CALCULATED.3IONS-SCNC: 8 MMOL/L (ref 9–17)
AST SERPL-CCNC: 14 U/L
BASOPHILS # BLD: 0 % (ref 0–2)
BASOPHILS ABSOLUTE: <0.03 K/UL (ref 0–0.2)
BILIRUB SERPL-MCNC: 0.91 MG/DL (ref 0.3–1.2)
BUN BLDV-MCNC: 16 MG/DL (ref 6–20)
BUN/CREAT BLD: ABNORMAL (ref 9–20)
CALCIUM SERPL-MCNC: 9.4 MG/DL (ref 8.6–10.4)
CHLORIDE BLD-SCNC: 100 MMOL/L (ref 98–107)
CHOLESTEROL, FASTING: 202 MG/DL
CHOLESTEROL/HDL RATIO: 4.3
CO2: 29 MMOL/L (ref 20–31)
CREAT SERPL-MCNC: 0.78 MG/DL (ref 0.7–1.2)
DIFFERENTIAL TYPE: ABNORMAL
EOSINOPHILS RELATIVE PERCENT: 0 % (ref 1–4)
ESTIMATED AVERAGE GLUCOSE: 77 MG/DL
GFR AFRICAN AMERICAN: >60 ML/MIN
GFR NON-AFRICAN AMERICAN: >60 ML/MIN
GFR SERPL CREATININE-BSD FRML MDRD: ABNORMAL ML/MIN/{1.73_M2}
GFR SERPL CREATININE-BSD FRML MDRD: ABNORMAL ML/MIN/{1.73_M2}
GLUCOSE BLD-MCNC: 94 MG/DL (ref 70–99)
HBA1C MFR BLD: 4.3 % (ref 4–6)
HCT VFR BLD CALC: 46 % (ref 40.7–50.3)
HDLC SERPL-MCNC: 47 MG/DL
HEMOGLOBIN: 15.8 G/DL (ref 13–17)
IMMATURE GRANULOCYTES: 1 %
LDL CHOLESTEROL: 130 MG/DL (ref 0–130)
LYMPHOCYTES # BLD: 16 % (ref 24–43)
MCH RBC QN AUTO: 31.2 PG (ref 25.2–33.5)
MCHC RBC AUTO-ENTMCNC: 34.3 G/DL (ref 28.4–34.8)
MCV RBC AUTO: 90.7 FL (ref 82.6–102.9)
MONOCYTES # BLD: 8 % (ref 3–12)
NRBC AUTOMATED: 0 PER 100 WBC
PDW BLD-RTO: 12.6 % (ref 11.8–14.4)
PLATELET # BLD: 218 K/UL (ref 138–453)
PLATELET ESTIMATE: ABNORMAL
PMV BLD AUTO: 10.1 FL (ref 8.1–13.5)
POTASSIUM SERPL-SCNC: 4.5 MMOL/L (ref 3.7–5.3)
RBC # BLD: 5.07 M/UL (ref 4.21–5.77)
RBC # BLD: ABNORMAL 10*6/UL
SEG NEUTROPHILS: 75 % (ref 36–65)
SEGMENTED NEUTROPHILS ABSOLUTE COUNT: 5.86 K/UL (ref 1.5–8.1)
SODIUM BLD-SCNC: 137 MMOL/L (ref 135–144)
TOTAL PROTEIN: 7 G/DL (ref 6.4–8.3)
TRIGLYCERIDE, FASTING: 127 MG/DL
TSH SERPL DL<=0.05 MIU/L-ACNC: 1.04 MIU/L (ref 0.3–5)
VLDLC SERPL CALC-MCNC: ABNORMAL MG/DL (ref 1–30)
WBC # BLD: 7.8 K/UL (ref 3.5–11.3)
WBC # BLD: ABNORMAL 10*3/UL

## 2021-03-16 ENCOUNTER — PATIENT MESSAGE (OUTPATIENT)
Dept: PRIMARY CARE CLINIC | Age: 46
End: 2021-03-16

## 2021-03-16 DIAGNOSIS — M77.9 TENDINITIS: Primary | ICD-10-CM

## 2021-12-15 ENCOUNTER — OFFICE VISIT (OUTPATIENT)
Dept: PRIMARY CARE CLINIC | Age: 46
End: 2021-12-15
Payer: COMMERCIAL

## 2021-12-15 VITALS
WEIGHT: 180.8 LBS | BODY MASS INDEX: 25.88 KG/M2 | SYSTOLIC BLOOD PRESSURE: 132 MMHG | DIASTOLIC BLOOD PRESSURE: 88 MMHG | HEIGHT: 70 IN | HEART RATE: 83 BPM | OXYGEN SATURATION: 99 %

## 2021-12-15 DIAGNOSIS — Z12.11 SCREEN FOR COLON CANCER: Primary | ICD-10-CM

## 2021-12-15 DIAGNOSIS — K59.00 CONSTIPATION, UNSPECIFIED CONSTIPATION TYPE: ICD-10-CM

## 2021-12-15 DIAGNOSIS — R39.11 HESITANCY: ICD-10-CM

## 2021-12-15 DIAGNOSIS — I10 ESSENTIAL (PRIMARY) HYPERTENSION: ICD-10-CM

## 2021-12-15 PROCEDURE — G8484 FLU IMMUNIZE NO ADMIN: HCPCS | Performed by: PHYSICIAN ASSISTANT

## 2021-12-15 PROCEDURE — 99214 OFFICE O/P EST MOD 30 MIN: CPT | Performed by: PHYSICIAN ASSISTANT

## 2021-12-15 PROCEDURE — G8419 CALC BMI OUT NRM PARAM NOF/U: HCPCS | Performed by: PHYSICIAN ASSISTANT

## 2021-12-15 PROCEDURE — G8427 DOCREV CUR MEDS BY ELIG CLIN: HCPCS | Performed by: PHYSICIAN ASSISTANT

## 2021-12-15 PROCEDURE — 1036F TOBACCO NON-USER: CPT | Performed by: PHYSICIAN ASSISTANT

## 2021-12-15 RX ORDER — LISINOPRIL 10 MG/1
10 TABLET ORAL DAILY
Qty: 30 TABLET | Refills: 0 | Status: SHIPPED | OUTPATIENT
Start: 2021-12-15 | End: 2022-01-17 | Stop reason: SDUPTHER

## 2021-12-15 RX ORDER — PSYLLIUM SEED (WITH DEXTROSE)
1 POWDER (GRAM) ORAL DAILY
Qty: 300 G | Refills: 2 | Status: SHIPPED | OUTPATIENT
Start: 2021-12-15

## 2021-12-15 ASSESSMENT — ENCOUNTER SYMPTOMS
RHINORRHEA: 0
CHEST TIGHTNESS: 0
CONSTIPATION: 1
PHOTOPHOBIA: 0
SINUS PRESSURE: 0
ABDOMINAL DISTENTION: 0
SHORTNESS OF BREATH: 0
COUGH: 0
ABDOMINAL PAIN: 0
VOMITING: 0
DIARRHEA: 0
SORE THROAT: 0
EYE DISCHARGE: 0

## 2021-12-15 NOTE — PROGRESS NOTES
[address] [place] [address] PRIMARY CARE  [address] [facility] [address]  Dept: [phone]    Bambi Ventura is a 55 y.o. male Established patient, who presents today for his medical conditions/complaints as noted below. Chief Complaint   Patient presents with    Constipation    Hypertension       HPI:     HPI: The patient is having high blood pressure readings. Blood pressure is elevated. Has constipation which seems to come and go. Has taken a probiotic. Hard to push through. Reviewed prior notes None  Reviewed previous Labs    LDL Cholesterol (mg/dL)   Date Value   03/01/2021 130     LDL Calculated (mg/dL)   Date Value   10/09/2017 68       (goal LDL is <100)   AST (U/L)   Date Value   03/01/2021 14     ALT (U/L)   Date Value   03/01/2021 31     BUN (mg/dL)   Date Value   03/01/2021 16     Hemoglobin A1C (%)   Date Value   03/01/2021 4.3     TSH (mIU/L)   Date Value   03/01/2021 1.04     BP Readings from Last 3 Encounters:   12/15/21 (!) 142/90   02/23/21 (!) 140/92   07/24/18 120/82          (goal 120/80)    No past medical history on file. Past Surgical History:   Procedure Laterality Date    TONSILLECTOMY      VASECTOMY      WISDOM TOOTH EXTRACTION         Family History   Problem Relation Age of Onset    Cancer Mother         thyroid    Heart Disease Father        Social History     Tobacco Use    Smoking status: Never Smoker    Smokeless tobacco: Never Used   Substance Use Topics    Alcohol use: Yes     Comment: 2 glasses of wine per day.        Current Outpatient Medications   Medication Sig Dispense Refill    lisinopril (PRINIVIL;ZESTRIL) 10 MG tablet Take 1 tablet by mouth daily 30 tablet 0    Psyllium (METAMUCIL) 48.57 % POWD Take 1 each by mouth daily 300 g 2    predniSONE (DELTASONE) 5 MG tablet Take 6 tabs bid x3 days, then 3 tabs bid x3 days, then 3 tabs daily x3 days, then 1 tab daily x3 days 66 tablet 0    omeprazole (PRILOSEC) 10 MG delayed release capsule Take 10 mg by mouth daily      fluticasone (FLONASE) 50 MCG/ACT nasal spray 1 spray by Nasal route 2 times daily       Cetirizine HCl (ZYRTEC ALLERGY PO) Take by mouth       No current facility-administered medications for this visit. Allergies   Allergen Reactions    Seasonal        Health Maintenance   Topic Date Due    Hepatitis C screen  Never done    HIV screen  Never done    Colon cancer screen colonoscopy  Never done    Flu vaccine (1) 09/01/2021    COVID-19 Vaccine (3 - Booster for Pfizer series) 10/14/2021    Lipid screen  03/01/2026    DTaP/Tdap/Td vaccine (2 - Td or Tdap) 09/14/2027    Hepatitis A vaccine  Aged Out    Hepatitis B vaccine  Aged Out    Hib vaccine  Aged Out    Meningococcal (ACWY) vaccine  Aged Out    Pneumococcal 0-64 years Vaccine  Aged Out       Subjective:      Review of Systems   Constitutional: Negative for chills, fever and unexpected weight change. HENT: Negative for congestion, hearing loss, rhinorrhea, sinus pressure and sore throat. Eyes: Negative for photophobia, discharge and visual disturbance. Respiratory: Negative for cough, chest tightness and shortness of breath. Cardiovascular: Negative for chest pain, palpitations and leg swelling. Gastrointestinal: Positive for constipation. Negative for abdominal distention, abdominal pain, diarrhea and vomiting. Endocrine: Negative for polydipsia and polyuria. Genitourinary: Negative for decreased urine volume, difficulty urinating, frequency and urgency. Musculoskeletal: Negative for arthralgias, gait problem and myalgias. Skin: Negative for rash. Allergic/Immunologic: Negative for food allergies. Neurological: Negative for dizziness, weakness, numbness and headaches. Hematological: Negative for adenopathy. Psychiatric/Behavioral: Negative for dysphoric mood and sleep disturbance. The patient is not nervous/anxious.         Objective:     BP (!) 142/90   Pulse 83   Ht 5' 10\" (1.778 m)   Wt 180 lb 12.8 oz (82 kg)   SpO2 99%   BMI 25.94 kg/m²   Physical Exam  Constitutional:       General: He is not in acute distress. Appearance: Normal appearance. He is not ill-appearing. HENT:      Head: Normocephalic and atraumatic. Right Ear: Tympanic membrane, ear canal and external ear normal.      Left Ear: Tympanic membrane, ear canal and external ear normal.      Nose: Nose normal.      Mouth/Throat:      Mouth: Mucous membranes are moist.   Eyes:      Extraocular Movements: Extraocular movements intact. Conjunctiva/sclera: Conjunctivae normal.      Pupils: Pupils are equal, round, and reactive to light. Neck:      Vascular: No carotid bruit. Cardiovascular:      Rate and Rhythm: Normal rate and regular rhythm. Pulses: Normal pulses. Heart sounds: Normal heart sounds. Pulmonary:      Effort: Pulmonary effort is normal. No respiratory distress. Breath sounds: Normal breath sounds. Abdominal:      General: Bowel sounds are normal. There is no distension. Tenderness: There is no abdominal tenderness. Musculoskeletal:         General: Normal range of motion. Cervical back: Normal range of motion and neck supple. Lymphadenopathy:      Cervical: No cervical adenopathy. Skin:     General: Skin is warm and dry. Neurological:      General: No focal deficit present. Mental Status: He is alert and oriented to person, place, and time. Psychiatric:         Mood and Affect: Mood normal.         Behavior: Behavior normal.         Thought Content: Thought content normal.         Assessment and Plan:          1. Screen for colon cancer  -     Cologuard; Future  2. Essential (primary) hypertension  -     lisinopril (PRINIVIL;ZESTRIL) 10 MG tablet; Take 1 tablet by mouth daily, Disp-30 tablet, R-0Normal  3. Hesitancy  -     PSA Screening; Future  4.  Constipation, unspecified constipation type  -     Psyllium (METAMUCIL) 48.57 % POWD; Take 1 each by mouth daily, Disp-300 g, R-2Normal     Has had booster and flu vaccine           Patient given educational materials - see patient instructions. Discussed use, benefit, and side effects of prescribed medications. All patient questions answered. Pt voiced understanding. Reviewed health maintenance. Instructed to continue current medications, diet and exercise. Patient agreed with treatment plan. Follow up as directed.      Electronically signed by BRIDGER Lopez on 12/15/2021 at 4:32 PM

## 2022-01-03 DIAGNOSIS — R39.11 HESITANCY: ICD-10-CM

## 2022-01-03 LAB — PROSTATE SPECIFIC ANTIGEN: 1.25 UG/L

## 2022-01-17 DIAGNOSIS — Z12.11 SCREEN FOR COLON CANCER: ICD-10-CM

## 2022-01-17 DIAGNOSIS — I10 ESSENTIAL (PRIMARY) HYPERTENSION: ICD-10-CM

## 2022-01-17 RX ORDER — LISINOPRIL 20 MG/1
20 TABLET ORAL DAILY
Qty: 30 TABLET | Refills: 0 | Status: SHIPPED | OUTPATIENT
Start: 2022-01-17 | End: 2022-02-11 | Stop reason: SDUPTHER

## 2022-02-11 DIAGNOSIS — I10 ESSENTIAL (PRIMARY) HYPERTENSION: ICD-10-CM

## 2022-02-11 RX ORDER — LISINOPRIL 20 MG/1
20 TABLET ORAL DAILY
Qty: 30 TABLET | Refills: 2 | Status: SHIPPED | OUTPATIENT
Start: 2022-02-11 | End: 2022-10-16

## 2022-10-16 ENCOUNTER — APPOINTMENT (OUTPATIENT)
Dept: CT IMAGING | Age: 47
End: 2022-10-16
Payer: COMMERCIAL

## 2022-10-16 ENCOUNTER — HOSPITAL ENCOUNTER (EMERGENCY)
Age: 47
Discharge: HOME | End: 2022-10-16
Attending: EMERGENCY MEDICINE
Payer: COMMERCIAL

## 2022-10-16 VITALS
SYSTOLIC BLOOD PRESSURE: 161 MMHG | BODY MASS INDEX: 25.77 KG/M2 | TEMPERATURE: 99.3 F | RESPIRATION RATE: 14 BRPM | WEIGHT: 180 LBS | HEIGHT: 70 IN | DIASTOLIC BLOOD PRESSURE: 101 MMHG | HEART RATE: 80 BPM | OXYGEN SATURATION: 97 %

## 2022-10-16 DIAGNOSIS — I10 HYPERTENSION, UNSPECIFIED TYPE: ICD-10-CM

## 2022-10-16 DIAGNOSIS — R51.9 INTRACTABLE HEADACHE, UNSPECIFIED CHRONICITY PATTERN, UNSPECIFIED HEADACHE TYPE: Primary | ICD-10-CM

## 2022-10-16 LAB
APPEARANCE CSF: CLEAR
APPEARANCE CSF: CLEAR
DIRECT EXAM: NORMAL
GLUCOSE, CSF: 64 MG/DL (ref 40–70)
PROTEIN CSF: 58 MG/DL (ref 15–45)
RBC CSF: 183 /MM3
RBC CSF: 31 /MM3
SPECIMEN DESCRIPTION: NORMAL
TUBE NUMBER CSF: 1
TUBE NUMBER CSF: 4
VOLUME CSF: ABNORMAL
VOLUME CSF: ABNORMAL
WBC CSF: 0 /MM3
WBC CSF: 0 /MM3
XANTHOCHROMIA: ABNORMAL
XANTHOCHROMIA: ABNORMAL

## 2022-10-16 PROCEDURE — 86618 LYME DISEASE ANTIBODY: CPT

## 2022-10-16 PROCEDURE — 99285 EMERGENCY DEPT VISIT HI MDM: CPT

## 2022-10-16 PROCEDURE — 70450 CT HEAD/BRAIN W/O DYE: CPT

## 2022-10-16 PROCEDURE — 62270 DX LMBR SPI PNXR: CPT

## 2022-10-16 PROCEDURE — 2580000003 HC RX 258: Performed by: EMERGENCY MEDICINE

## 2022-10-16 PROCEDURE — 6360000004 HC RX CONTRAST MEDICATION: Performed by: EMERGENCY MEDICINE

## 2022-10-16 PROCEDURE — 87205 SMEAR GRAM STAIN: CPT

## 2022-10-16 PROCEDURE — 70498 CT ANGIOGRAPHY NECK: CPT

## 2022-10-16 PROCEDURE — 89050 BODY FLUID CELL COUNT: CPT

## 2022-10-16 PROCEDURE — 82945 GLUCOSE OTHER FLUID: CPT

## 2022-10-16 PROCEDURE — 84157 ASSAY OF PROTEIN OTHER: CPT

## 2022-10-16 RX ORDER — LISINOPRIL 20 MG/1
20 TABLET ORAL DAILY
Qty: 30 TABLET | Refills: 0 | Status: SHIPPED | OUTPATIENT
Start: 2022-10-16 | End: 2022-10-18 | Stop reason: SDUPTHER

## 2022-10-16 RX ORDER — 0.9 % SODIUM CHLORIDE 0.9 %
80 INTRAVENOUS SOLUTION INTRAVENOUS ONCE
Status: DISCONTINUED | OUTPATIENT
Start: 2022-10-16 | End: 2022-10-16 | Stop reason: HOSPADM

## 2022-10-16 RX ORDER — SODIUM CHLORIDE 0.9 % (FLUSH) 0.9 %
10 SYRINGE (ML) INJECTION PRN
Status: DISCONTINUED | OUTPATIENT
Start: 2022-10-16 | End: 2022-10-16 | Stop reason: HOSPADM

## 2022-10-16 RX ADMIN — Medication 80 ML: at 13:08

## 2022-10-16 RX ADMIN — IOPAMIDOL 75 ML: 755 INJECTION, SOLUTION INTRAVENOUS at 13:05

## 2022-10-16 RX ADMIN — SODIUM CHLORIDE, PRESERVATIVE FREE 10 ML: 5 INJECTION INTRAVENOUS at 13:07

## 2022-10-16 ASSESSMENT — PAIN - FUNCTIONAL ASSESSMENT: PAIN_FUNCTIONAL_ASSESSMENT: 0-10

## 2022-10-16 ASSESSMENT — PAIN DESCRIPTION - FREQUENCY: FREQUENCY: CONTINUOUS

## 2022-10-16 ASSESSMENT — PAIN DESCRIPTION - PAIN TYPE: TYPE: ACUTE PAIN

## 2022-10-16 ASSESSMENT — PAIN SCALES - GENERAL: PAINLEVEL_OUTOF10: 3

## 2022-10-16 ASSESSMENT — PAIN DESCRIPTION - LOCATION: LOCATION: HEAD

## 2022-10-16 ASSESSMENT — PAIN DESCRIPTION - DESCRIPTORS: DESCRIPTORS: ACHING

## 2022-10-16 NOTE — ED PROVIDER NOTES
[address]      Pt Name: Emanuel Enrique  MRN: [id number]  [place] 1975  Date of evaluation: 10/16/2022      CHIEF COMPLAINT       Chief Complaint   Patient presents with    Headache     Sudden headache, started yesterday at 11pm with no relief            HISTORY OF PRESENT ILLNESS      The presents having started having a headache at 11 AM yesterday. He was helping his son move furniture. He says it was maximal in onset and in the posterior aspect of the left side of his head. He says that it made him nauseated and his eyes were watery. It lasted for about 6 to 8 hours. He went home and laid down. He did not notice any focal focal deficits or vision change however. The pain is minor at this time but it persist.  He decided to come in. It has been 22 hours since the onset of the headache. The patient has no history of prior headaches. He does have hypertension here on initial evaluation. He has been working with his doctor about his hypertension. He did not take lisinopril today. REVIEW OF SYSTEMS       All systems reviewed and negative unless noted in HPI. The patient denies fever or constitutional symptoms. Denies vision change. Denies any sore throat or rhinorrhea. Denies any neck pain or stiffness. Denies chest pain or shortness of breath. No nausea,  vomiting or diarrhea. Denies any dysuria. Denies urinary frequency or hematuria. Denies musculoskeletal injury or pain. Denies any weakness, numbness or focal neurologic deficit. Headache as described in HPI. Denies any skin rash or edema. No recent psychiatric issues. No easy bruising or bleeding. Denies any polyuria, polydypsia or history of immunocompromise. PAST MEDICAL HISTORY    has a past medical history of Hypertension.     SURGICAL HISTORY      has a past surgical history that includes Vasectomy; [place] tooth extraction; and Tonsillectomy. CURRENT MEDICATIONS       Previous Medications    CETIRIZINE HCL (ZYRTEC ALLERGY PO)    Take by mouth    FEXOFENADINE-PSEUDOEPHEDRINE (ALLEGRA-D PO)    Take by mouth as needed    FLUTICASONE (FLONASE) 50 MCG/ACT NASAL SPRAY    1 spray by Nasal route 2 times daily     OMEPRAZOLE (PRILOSEC) 10 MG DELAYED RELEASE CAPSULE    Take 10 mg by mouth daily    PREDNISONE (DELTASONE) 5 MG TABLET    Take 6 tabs bid x3 days, then 3 tabs bid x3 days, then 3 tabs daily x3 days, then 1 tab daily x3 days    PSYLLIUM (METAMUCIL) 48.57 % POWD    Take 1 each by mouth daily       ALLERGIES     is allergic to no known allergies and seasonal.    FAMILY HISTORY     He indicated that his mother is alive. He indicated that his father is alive. He indicated that his brother is alive. family history includes Cancer in his mother; Heart Disease in his father. SOCIAL HISTORY      reports that he has never smoked. He has never used smokeless tobacco. He reports current alcohol use. He reports that he does not use drugs. PHYSICAL EXAM     INITIAL VITALS:  height is 5' 10\" (1.778 m) and weight is 81.6 kg (180 lb). His oral temperature is 99.3 °F (37.4 °C). His blood pressure is 161/101 (abnormal) and his pulse is 80. His respiration is 14 and oxygen saturation is 97%. The patient is alert and oriented, in no apparent distress. HEENT is atraumatic. Pupils are PERRL at 4 mm with normal extraocular motion. Mucous membranes moist.    Neck is supple. No meningismus. Heart sounds regular rate and rhythm with no gallops, murmurs, or rubs. Lungs clear, no wheezes, rales or rhonchi. Abdomen: soft, nontender with no pain to palpation. Musculoskeletal exam shows no evidence of trauma. Normal distal pulses in all extremities. Skin: no rash or edema. Neurological exam reveals cranial nerves 2 through 12 grossly intact. Patient has equal  and normal deep tendon reflexes.   Ambulates without difficulty. Psychiatric: no hallucinations or suicidal ideation. Lymphatics.:  No lymphadenopathy. DIFFERENTIAL DIAGNOSIS/ MDM:     Subarachnoid hemorrhage, cephalgia, meningitis, hypertension    DIAGNOSTIC RESULTS       RADIOLOGY:   I reviewed the radiologist interpretations:  CTA HEAD NECK W CONTRAST   Final Result   Unremarkable CTA of the head and neck. CT HEAD WO CONTRAST   Final Result   No acute intracranial abnormality. CT HEAD WO CONTRAST (Final result)  Result time 10/16/22 09:37:50  Final result by Milena Matamoros MD (10/16/22 09:37:50)                Impression:    No acute intracranial abnormality. Narrative:    EXAMINATION:   CT OF THE HEAD WITHOUT CONTRAST  10/16/2022 9:32 am     TECHNIQUE:   CT of the head was performed without the administration of intravenous   contrast. Automated exposure control, iterative reconstruction, and/or weight   based adjustment of the mA/kV was utilized to reduce the radiation dose to as   low as reasonably achievable. COMPARISON:   None. HISTORY:   ORDERING SYSTEM PROVIDED HISTORY: severe headache at 11 am yesterday   TECHNOLOGIST PROVIDED HISTORY:     severe headache at 11 am yesterday   Decision Support Exception - unselect if not a suspected or confirmed   emergency medical condition->Emergency Medical Condition (MA)   Reason for Exam: posterior left headache started yesterday, Friday had bad   sinus headache     FINDINGS:   BRAIN/VENTRICLES: There is no acute intracranial hemorrhage, mass effect, or   midline shift. There is satisfactory overall gray-white matter   differentiation. The ventricular structures are symmetric and unremarkable. The infratentorial structures are unremarkable. ORBITS: The visualized portion of the orbits demonstrate no acute abnormality. SINUSES: The visualized paranasal sinuses and mastoid air cells demonstrate   no acute abnormality.      SOFT TISSUES/SKULL:  No acute abnormality of the visualized skull or soft   tissues. CTA HEAD NECK W CONTRAST (Final result)  Result time 10/16/22 14:04:34  Final result by Jelani Mejía MD (10/16/22 14:04:34)                Impression:    Unremarkable CTA of the head and neck. Narrative:    EXAMINATION:   CTA OF THE HEAD AND NECK WITH CONTRAST 10/16/2022 1:05 pm:     TECHNIQUE:   CTA of the head and neck was performed with the administration of intravenous   contrast. Multiplanar reformatted images are provided for review. MIP images   are provided for review. Stenosis of the internal carotid arteries measured   using NASCET criteria. Automated exposure control, iterative reconstruction,   and/or weight based adjustment of the mA/kV was utilized to reduce the   radiation dose to as low as reasonably achievable. COMPARISON:   Noncontrast CT head from earlier today     HISTORY:   ORDERING SYSTEM PROVIDED HISTORY: headache   TECHNOLOGIST PROVIDED HISTORY:   headache     Decision Support Exception - unselect if not a suspected or confirmed   emergency medical condition->Emergency Medical Condition (MA)   Reason for Exam: posterior headache     FINDINGS:     CTA NECK:     AORTIC ARCH/ARCH VESSELS: No dissection or arterial injury. No significant   stenosis of the brachiocephalic or subclavian arteries. CAROTID ARTERIES: No dissection, arterial injury, or hemodynamically   significant stenosis by NASCET criteria. VERTEBRAL ARTERIES: No dissection, arterial injury, or significant stenosis. SOFT TISSUES: The lung apices are clear. No cervical or superior mediastinal   lymphadenopathy. The larynx and pharynx are unremarkable. No acute   abnormality of the salivary and thyroid glands. BONES: No acute osseous abnormality. CTA HEAD:     ANTERIOR CIRCULATION: No significant stenosis of the intracranial internal   carotid, anterior cerebral, or middle cerebral arteries. No aneurysm.      POSTERIOR CIRCULATION: No significant stenosis of the vertebral, basilar, or   posterior cerebral arteries. No aneurysm. OTHER: No dural venous sinus thrombosis on this non-dedicated study. BRAIN: No mass effect or midline shift. No extra-axial fluid collection. The   gray-white differentiation is maintained. LABS:  Results for orders placed or performed during the hospital encounter of 10/16/22   Gram Stain    Specimen: Spinal Fluid   Result Value Ref Range    Specimen Description . SPINAL FLUID     Direct Exam NO BACTERIA SEEN     Direct Exam NO WBC'S SEEN     Direct Exam       Gram stain made from cytocentrifuged specimen. Organisms and cells will be concentrated. Cell Count with Differential, CSF   Result Value Ref Range    Volume, CSF 4CC     Appearance, CSF CLEAR     Xanthochromia ABSENT     WBC, CSF 0 <5 /mm3    RBC, CSF 31 (H) 0 /mm3    Tube Number, CSF 4    Glucose, CSF   Result Value Ref Range    Glucose, CSF 64 40 - 70 mg/dL   Protein, CSF   Result Value Ref Range    Protein, CSF 58.0 (H) 15.0 - 45.0 mg/dL   Cell Count with Differential, CSF   Result Value Ref Range    Volume, CSF 4CC     Appearance, CSF CLEAR     Xanthochromia ABSENT     WBC, CSF 0 <5 /mm3    RBC,  (H) 0 /mm3    Tube Number, CSF 1          EMERGENCY DEPARTMENT COURSE:   Vitals:    Vitals:    10/16/22 0909 10/16/22 0913   BP: (!) 161/101    Pulse: 80    Resp: 14    Temp: 99.3 °F (37.4 °C)    TempSrc: Oral    SpO2: 97%    Weight:  81.6 kg (180 lb)   Height:  5' 10\" (1.778 m)     -------------------------  BP: (!) 161/101, Temp: 99.3 °F (37.4 °C), Heart Rate: 80, Resp: 14      Re-evaluation Notes    The patient has had a mild headache, but nothing is as severe as his headache that he experienced yesterday. He does have consistent hypertension. He has been not taking his medicines for 2 months. I am going to write him for lisinopril to start up taking again.   He needs to follow-up with his doctor about his hypertension. There is no evidence of stroke, aneurysm, or AV malformation. The patient is discharged in good condition. CONSULTS:    [facility] [facility] contacted for consult with stroke neurologist.  [id number]  Discussed with Dr. Giacomo Rogers. Add CTA head and neck. [address] [place] contacted to speak with Dr. Giacomo Rogers. Dis    PROCEDURES:    This procedure has been fully reviewed with the patient and written informed consent has been obtained. Lumbar puncture: The patient was positioned seated on the bed. The lumbar area was prepped and draped in a sterile fashion. Betadine was used. The patient was anesthetized with local instillation of 1% lidocaine without epinephrine. The L4-5 interspace was accessed using a 22 Hitesh needle. I was able to obtain CSF fluid that appeared clear and colorless. The needle was removed and the patient had a Band-Aid applied. He recovered in the room. FINAL IMPRESSION      1. Intractable headache, unspecified chronicity pattern, unspecified headache type    2.  Hypertension, unspecified type          DISPOSITION/PLAN   DISPOSITION Decision To Discharge 10/16/2022 02:14:51 PM      Condition on Disposition    good    PATIENT REFERRED TO:  BRIDGER Hagan  [address]  [place] [address] [phone]    In 3 days      DISCHARGE MEDICATIONS:  New Prescriptions    LISINOPRIL (PRINIVIL;ZESTRIL) 20 MG TABLET    Take 1 tablet by mouth daily       (Please note that portions of this note were completed with a voice recognition program.  Efforts were made to edit the dictations but occasionally words are mis-transcribed.)    Cb Cabrera MD,, MD   Attending Emergency Physician         Ashley Galvin MD  10/16/22 1222

## 2022-10-16 NOTE — DISCHARGE INSTRUCTIONS
Restart lisinopril. See your doctor to follow up about your blood pressure. Return for worsening pain, weakness, numbness, or if worse in any way. PLEASE RETURN TO THE EMERGENCY DEPARTMENT IMMEDIATELY if your symptoms worsen in anyway or in 8-12 hours if not improved for re-evaluation. You should immediately return to the ER for symptoms such as new or worsening pain, fever, visual or hearing changes, stiff neck, rash, a feeling of passing out, chest pain, shortness of breath, persistent nausea and/or vomiting, numbness or weakness to the arms or legs, coolness or color change of the arms or legs. Take your medication as indicated and prescribed. If you are given an antibiotic then, make sure you get the prescription filled and take the antibiotics until finished. Please understand that at this time there is no evidence for a more serious underlying process, but that early in the process of an illness or injury, an emergency department workup can be falsely reassuring. You should contact your family doctor within the next 24 hours for a follow up appointment    Lashell Nolasco!!!    From [facility]) and [facility] Emergency Services    On behalf of the Emergency Department staff at [facility]), I would like to thank you for giving us the opportunity to address your health care needs and concerns. We hope that during your visit, our service was delivered in a professional and caring manner. Please keep [facility]) in mind as we walk with you down the path to your own personal wellness. Please expect an automated text message or email from us so we can ask a few questions about your health and progress. Based on your answers, a clinician may call you back to offer help and instructions. Please understand that early in the process of an illness or injury, an emergency department workup can be falsely reassuring.   If you notice any worsening, changing or persistent symptoms please call your family doctor or return to the ER immediately. Tell us how we did during your visit at [email or web address]   and let us know about your experience

## 2022-10-18 ENCOUNTER — OFFICE VISIT (OUTPATIENT)
Dept: PRIMARY CARE CLINIC | Age: 47
End: 2022-10-18
Payer: COMMERCIAL

## 2022-10-18 VITALS
OXYGEN SATURATION: 98 % | HEIGHT: 70 IN | HEART RATE: 68 BPM | SYSTOLIC BLOOD PRESSURE: 130 MMHG | DIASTOLIC BLOOD PRESSURE: 78 MMHG | WEIGHT: 177.4 LBS | BODY MASS INDEX: 25.4 KG/M2

## 2022-10-18 DIAGNOSIS — I10 ESSENTIAL (PRIMARY) HYPERTENSION: Primary | ICD-10-CM

## 2022-10-18 DIAGNOSIS — G43.119 INTRACTABLE MIGRAINE WITH AURA WITHOUT STATUS MIGRAINOSUS: ICD-10-CM

## 2022-10-18 DIAGNOSIS — R51.9 INTRACTABLE EPISODIC HEADACHE, UNSPECIFIED HEADACHE TYPE: ICD-10-CM

## 2022-10-18 PROCEDURE — G8484 FLU IMMUNIZE NO ADMIN: HCPCS | Performed by: PHYSICIAN ASSISTANT

## 2022-10-18 PROCEDURE — 99214 OFFICE O/P EST MOD 30 MIN: CPT | Performed by: PHYSICIAN ASSISTANT

## 2022-10-18 PROCEDURE — G8419 CALC BMI OUT NRM PARAM NOF/U: HCPCS | Performed by: PHYSICIAN ASSISTANT

## 2022-10-18 PROCEDURE — G8427 DOCREV CUR MEDS BY ELIG CLIN: HCPCS | Performed by: PHYSICIAN ASSISTANT

## 2022-10-18 PROCEDURE — 1036F TOBACCO NON-USER: CPT | Performed by: PHYSICIAN ASSISTANT

## 2022-10-18 RX ORDER — LISINOPRIL 20 MG/1
20 TABLET ORAL DAILY
Qty: 30 TABLET | Refills: 3 | Status: SHIPPED | OUTPATIENT
Start: 2022-10-18

## 2022-10-18 RX ORDER — SUMATRIPTAN 25 MG/1
25 TABLET, FILM COATED ORAL
Qty: 9 TABLET | Refills: 0 | Status: SHIPPED | OUTPATIENT
Start: 2022-10-18 | End: 2022-10-18

## 2022-10-18 SDOH — ECONOMIC STABILITY: FOOD INSECURITY: WITHIN THE PAST 12 MONTHS, YOU WORRIED THAT YOUR FOOD WOULD RUN OUT BEFORE YOU GOT MONEY TO BUY MORE.: NEVER TRUE

## 2022-10-18 SDOH — ECONOMIC STABILITY: FOOD INSECURITY: WITHIN THE PAST 12 MONTHS, THE FOOD YOU BOUGHT JUST DIDN'T LAST AND YOU DIDN'T HAVE MONEY TO GET MORE.: NEVER TRUE

## 2022-10-18 ASSESSMENT — PATIENT HEALTH QUESTIONNAIRE - PHQ9
SUM OF ALL RESPONSES TO PHQ QUESTIONS 1-9: 0
SUM OF ALL RESPONSES TO PHQ QUESTIONS 1-9: 0
2. FEELING DOWN, DEPRESSED OR HOPELESS: 0
SUM OF ALL RESPONSES TO PHQ QUESTIONS 1-9: 0
SUM OF ALL RESPONSES TO PHQ9 QUESTIONS 1 & 2: 0
1. LITTLE INTEREST OR PLEASURE IN DOING THINGS: 0
SUM OF ALL RESPONSES TO PHQ QUESTIONS 1-9: 0

## 2022-10-18 ASSESSMENT — ENCOUNTER SYMPTOMS
SHORTNESS OF BREATH: 0
DIARRHEA: 0
CONSTIPATION: 0
ABDOMINAL DISTENTION: 0
EYE DISCHARGE: 0
PHOTOPHOBIA: 0
VOMITING: 0
CHEST TIGHTNESS: 0
ABDOMINAL PAIN: 0
SORE THROAT: 0
RHINORRHEA: 0
COUGH: 0
SINUS PRESSURE: 0

## 2022-10-18 ASSESSMENT — SOCIAL DETERMINANTS OF HEALTH (SDOH): HOW HARD IS IT FOR YOU TO PAY FOR THE VERY BASICS LIKE FOOD, HOUSING, MEDICAL CARE, AND HEATING?: NOT HARD AT ALL

## 2022-10-18 NOTE — PROGRESS NOTES
[address] [facility] PRIMARY CARE  [address] [facility] [address]  Dept: [phone]    Joselito Damon is a 52 y.o. male Established patient, who presents today for his medical conditions/complaints as noted below. Chief Complaint   Patient presents with    Headache     Patient states slight headache on friday. Patient states severe headache Saturday after helping son move furniture. Patient states he was seen at [facility] ED. Patient states currently still has headache in back of head. Hypertension     Patient started back on blood pressure medication prescribed by ED. HPI:     HPI: The patient has had head ache in front and back of head. He does get sinus headaches frequently. He was lifting to help move furniture and jumped out of the truck. He was having shooting throbbing pain for about 6 hours on back of head. Came back next day and was seen in ER. Today and yesterday having pain in back of head again. Was out of BP medications and has had that started again.      Reviewed prior notes None  Reviewed previous Labs    LDL Cholesterol (mg/dL)   Date Value   03/01/2021 130     LDL Calculated (mg/dL)   Date Value   10/09/2017 68       (goal LDL is <100)   AST (U/L)   Date Value   03/01/2021 14     ALT (U/L)   Date Value   03/01/2021 31     BUN (mg/dL)   Date Value   03/01/2021 16     Hemoglobin A1C (%)   Date Value   03/01/2021 4.3     TSH (mIU/L)   Date Value   03/01/2021 1.04     BP Readings from Last 3 Encounters:   10/18/22 130/78   10/16/22 (!) 161/101   12/15/21 132/88          (goal 120/80)  Hemoglobin A1C   Date Value Ref Range Status   03/01/2021 4.3 4.0 - 6.0 % Final     Past Medical History:   Diagnosis Date    Hypertension       Past Surgical History:   Procedure Laterality Date    TONSILLECTOMY      VASECTOMY      WISDOM TOOTH EXTRACTION         Family History   Problem Relation Age of Onset    Cancer Mother         thyroid    Heart Disease Father        Social History     Tobacco Use    Smoking status: Never    Smokeless tobacco: Never   Substance Use Topics    Alcohol use: Yes     Comment: 2 glasses of wine per day. Current Outpatient Medications   Medication Sig Dispense Refill    lisinopril (PRINIVIL;ZESTRIL) 20 MG tablet Take 1 tablet by mouth daily 30 tablet 3    SUMAtriptan (IMITREX) 25 MG tablet Take 1 tablet by mouth once as needed for Migraine 9 tablet 0    Fexofenadine-Pseudoephedrine (ALLEGRA-D PO) Take by mouth as needed      Psyllium (METAMUCIL) 48.57 % POWD Take 1 each by mouth daily 300 g 2    omeprazole (PRILOSEC) 10 MG delayed release capsule Take 10 mg by mouth daily      fluticasone (FLONASE) 50 MCG/ACT nasal spray 1 spray by Nasal route 2 times daily        No current facility-administered medications for this visit. Allergies   Allergen Reactions    No Known Allergies     Seasonal        Health Maintenance   Topic Date Due    HIV screen  Never done    Hepatitis C screen  Never done    Flu vaccine (1) 08/01/2022    Depression Screen  10/18/2023    Colorectal Cancer Screen  01/11/2025    Lipids  03/01/2026    DTaP/Tdap/Td vaccine (2 - Td or Tdap) 09/14/2027    COVID-19 Vaccine  Completed    Hepatitis A vaccine  Aged Out    Hib vaccine  Aged Out    Meningococcal (ACWY) vaccine  Aged Out    Pneumococcal 0-64 years Vaccine  Aged Out       Subjective:      Review of Systems   Constitutional:  Negative for chills, fever and unexpected weight change. HENT:  Negative for congestion, hearing loss, rhinorrhea, sinus pressure and sore throat. Eyes:  Negative for photophobia, discharge and visual disturbance. Respiratory:  Negative for cough, chest tightness and shortness of breath. Cardiovascular:  Negative for chest pain, palpitations and leg swelling. Gastrointestinal:  Negative for abdominal distention, abdominal pain, constipation, diarrhea and vomiting. Endocrine: Negative for polydipsia and polyuria. Genitourinary:  Negative for decreased urine volume, difficulty urinating, frequency and urgency. Musculoskeletal:  Negative for arthralgias, gait problem and myalgias. Skin:  Negative for rash. Allergic/Immunologic: Negative for food allergies. Neurological:  Negative for dizziness, weakness, numbness and headaches. Hematological:  Negative for adenopathy. Psychiatric/Behavioral:  Negative for dysphoric mood and sleep disturbance. The patient is not nervous/anxious. Objective:     /78   Pulse 68   Ht 5' 10\" (1.778 m)   Wt 177 lb 6.4 oz (80.5 kg)   SpO2 98%   BMI 25.45 kg/m²   Physical Exam  Constitutional:       General: He is not in acute distress. Appearance: Normal appearance. He is not ill-appearing. HENT:      Head: Normocephalic and atraumatic. Right Ear: External ear normal.      Left Ear: External ear normal.      Nose: Nose normal.      Mouth/Throat:      Mouth: Mucous membranes are moist.   Eyes:      Extraocular Movements: Extraocular movements intact. Conjunctiva/sclera: Conjunctivae normal.      Pupils: Pupils are equal, round, and reactive to light. Neck:      Vascular: No carotid bruit. Cardiovascular:      Rate and Rhythm: Normal rate and regular rhythm. Pulses: Normal pulses. Heart sounds: Normal heart sounds. Pulmonary:      Effort: Pulmonary effort is normal. No respiratory distress. Breath sounds: Normal breath sounds. Abdominal:      General: Bowel sounds are normal. There is no distension. Tenderness: There is no abdominal tenderness. Musculoskeletal:         General: Normal range of motion. Cervical back: Normal range of motion and neck supple. Lymphadenopathy:      Cervical: No cervical adenopathy. Skin:     General: Skin is warm and dry. Neurological:      General: No focal deficit present. Mental Status: He is alert and oriented to person, place, and time.    Psychiatric:         Mood and Affect:

## 2022-10-19 LAB — B BURGDORFERI AB,CSF: 0.05 LIV

## 2022-10-20 ENCOUNTER — PATIENT MESSAGE (OUTPATIENT)
Dept: PRIMARY CARE CLINIC | Age: 47
End: 2022-10-20

## 2022-10-20 DIAGNOSIS — G43.119 INTRACTABLE MIGRAINE WITH AURA WITHOUT STATUS MIGRAINOSUS: Primary | ICD-10-CM

## 2022-10-20 DIAGNOSIS — R51.9 INTRACTABLE EPISODIC HEADACHE, UNSPECIFIED HEADACHE TYPE: ICD-10-CM

## 2022-10-24 DIAGNOSIS — R51.9 INTRACTABLE EPISODIC HEADACHE, UNSPECIFIED HEADACHE TYPE: Primary | ICD-10-CM

## 2022-10-24 RX ORDER — PREDNISONE 20 MG/1
20 TABLET ORAL 2 TIMES DAILY
Qty: 10 TABLET | Refills: 0 | Status: SHIPPED | OUTPATIENT
Start: 2022-10-24 | End: 2022-10-29

## 2023-03-20 ENCOUNTER — OFFICE VISIT (OUTPATIENT)
Dept: PRIMARY CARE CLINIC | Age: 48
End: 2023-03-20
Payer: COMMERCIAL

## 2023-03-20 VITALS
WEIGHT: 184.2 LBS | OXYGEN SATURATION: 98 % | HEIGHT: 70 IN | SYSTOLIC BLOOD PRESSURE: 120 MMHG | HEART RATE: 88 BPM | DIASTOLIC BLOOD PRESSURE: 74 MMHG | BODY MASS INDEX: 26.37 KG/M2

## 2023-03-20 DIAGNOSIS — Z78.9 HEALTH MAINTENANCE ALTERATION: ICD-10-CM

## 2023-03-20 DIAGNOSIS — I10 ESSENTIAL (PRIMARY) HYPERTENSION: Primary | ICD-10-CM

## 2023-03-20 PROCEDURE — 1036F TOBACCO NON-USER: CPT | Performed by: PHYSICIAN ASSISTANT

## 2023-03-20 PROCEDURE — 99213 OFFICE O/P EST LOW 20 MIN: CPT | Performed by: PHYSICIAN ASSISTANT

## 2023-03-20 PROCEDURE — G8427 DOCREV CUR MEDS BY ELIG CLIN: HCPCS | Performed by: PHYSICIAN ASSISTANT

## 2023-03-20 PROCEDURE — 3074F SYST BP LT 130 MM HG: CPT | Performed by: PHYSICIAN ASSISTANT

## 2023-03-20 PROCEDURE — G8419 CALC BMI OUT NRM PARAM NOF/U: HCPCS | Performed by: PHYSICIAN ASSISTANT

## 2023-03-20 PROCEDURE — G8484 FLU IMMUNIZE NO ADMIN: HCPCS | Performed by: PHYSICIAN ASSISTANT

## 2023-03-20 PROCEDURE — 3078F DIAST BP <80 MM HG: CPT | Performed by: PHYSICIAN ASSISTANT

## 2023-03-20 RX ORDER — LISINOPRIL 20 MG/1
20 TABLET ORAL DAILY
Qty: 90 TABLET | Refills: 1 | Status: SHIPPED | OUTPATIENT
Start: 2023-03-20

## 2023-03-20 ASSESSMENT — PATIENT HEALTH QUESTIONNAIRE - PHQ9
SUM OF ALL RESPONSES TO PHQ QUESTIONS 1-9: 0
1. LITTLE INTEREST OR PLEASURE IN DOING THINGS: 0
SUM OF ALL RESPONSES TO PHQ QUESTIONS 1-9: 0
DEPRESSION UNABLE TO ASSESS: PT REFUSES
2. FEELING DOWN, DEPRESSED OR HOPELESS: 0
SUM OF ALL RESPONSES TO PHQ QUESTIONS 1-9: 0
SUM OF ALL RESPONSES TO PHQ9 QUESTIONS 1 & 2: 0
SUM OF ALL RESPONSES TO PHQ QUESTIONS 1-9: 0

## 2023-03-20 ASSESSMENT — ENCOUNTER SYMPTOMS
CHEST TIGHTNESS: 0
SORE THROAT: 0
WHEEZING: 0
SHORTNESS OF BREATH: 0
COUGH: 0
VOMITING: 0
NAUSEA: 0

## 2023-03-20 NOTE — PROGRESS NOTES
[address] [facility] PRIMARY CARE  [address]  [facility] [address]  Dept: [phone]    Demetria Hannah is a 52 y.o. male Established patient, who presents today for his medical conditions/complaints as noted below. Chief Complaint   Patient presents with    Hypertension     Patient check bp at home average is 127/79       HPI:     HPI: The patient is a pleasant 60-year-old male who presents today with concerns of hypertension check. Patient currently taking lisinopril 20 mg daily. No negative side effects at this time. Reviewed prior notes None  Reviewed previous Labs    LDL Cholesterol (mg/dL)   Date Value   03/01/2021 130     LDL Calculated (mg/dL)   Date Value   10/09/2017 68       (goal LDL is <100)   AST (U/L)   Date Value   03/01/2021 14     ALT (U/L)   Date Value   03/01/2021 31     BUN (mg/dL)   Date Value   03/01/2021 16     Hemoglobin A1C (%)   Date Value   03/01/2021 4.3     TSH (mIU/L)   Date Value   03/01/2021 1.04     BP Readings from Last 3 Encounters:   03/20/23 120/74   10/18/22 130/78   10/16/22 (!) 161/101          (goal 120/80)  Hemoglobin A1C   Date Value Ref Range Status   03/01/2021 4.3 4.0 - 6.0 % Final     Past Medical History:   Diagnosis Date    Allergic rhinitis Forever    Completed sublingual immunotherapy treatment. GERD (gastroesophageal reflux disease) 15+ years    Take Omeprazole for GERD. Hypertension       Past Surgical History:   Procedure Laterality Date    TONSILLECTOMY      VASECTOMY      WISDOM TOOTH EXTRACTION         Family History   Problem Relation Age of Onset    Cancer Mother         Thyroid    Heart Disease Father         Coronary stents       Social History     Tobacco Use    Smoking status: Never    Smokeless tobacco: Never   Substance Use Topics    Alcohol use: Yes     Alcohol/week: 14.0 standard drinks     Types: 14 Glasses of wine per week     Comment: 2 glasses of wine per day.        Current Outpatient

## 2023-06-23 DIAGNOSIS — I10 ESSENTIAL (PRIMARY) HYPERTENSION: ICD-10-CM

## 2023-06-23 DIAGNOSIS — Z78.9 HEALTH MAINTENANCE ALTERATION: ICD-10-CM

## 2023-06-23 LAB
ABSOLUTE EOS #: 0.1 K/UL (ref 0–0.44)
ABSOLUTE IMMATURE GRANULOCYTE: <0.03 K/UL (ref 0–0.3)
ABSOLUTE LYMPH #: 1.42 K/UL (ref 1.1–3.7)
ABSOLUTE MONO #: 0.41 K/UL (ref 0.1–1.2)
ALBUMIN SERPL-MCNC: 4.3 G/DL (ref 3.5–5.2)
ALBUMIN/GLOBULIN RATIO: 2 (ref 1–2.5)
ALP BLD-CCNC: 85 U/L (ref 40–129)
ALT SERPL-CCNC: 23 U/L (ref 5–41)
ANION GAP SERPL CALCULATED.3IONS-SCNC: 11 MMOL/L (ref 9–17)
AST SERPL-CCNC: 17 U/L
BASOPHILS # BLD: 0 % (ref 0–2)
BASOPHILS ABSOLUTE: <0.03 K/UL (ref 0–0.2)
BILIRUB SERPL-MCNC: 0.9 MG/DL (ref 0.3–1.2)
BUN BLDV-MCNC: 9 MG/DL (ref 6–20)
CALCIUM SERPL-MCNC: 9 MG/DL (ref 8.6–10.4)
CHLORIDE BLD-SCNC: 106 MMOL/L (ref 98–107)
CHOLESTEROL, FASTING: 152 MG/DL
CHOLESTEROL/HDL RATIO: 4.3
CO2: 25 MMOL/L (ref 20–31)
CREAT SERPL-MCNC: 0.73 MG/DL (ref 0.7–1.2)
EOSINOPHILS RELATIVE PERCENT: 2 % (ref 1–4)
GFR SERPL CREATININE-BSD FRML MDRD: >60 ML/MIN/1.73M2
GLUCOSE BLD-MCNC: 83 MG/DL (ref 70–99)
HCT VFR BLD CALC: 44.7 % (ref 40.7–50.3)
HDLC SERPL-MCNC: 35 MG/DL
HEMOGLOBIN: 15.8 G/DL (ref 13–17)
HEPATITIS C ANTIBODY: NONREACTIVE
HIV AG/AB: NONREACTIVE
IMMATURE GRANULOCYTES: 0 %
LDL CHOLESTEROL: 87 MG/DL (ref 0–130)
LYMPHOCYTES # BLD: 29 % (ref 24–43)
MCH RBC QN AUTO: 31.9 PG (ref 25.2–33.5)
MCHC RBC AUTO-ENTMCNC: 35.3 G/DL (ref 28.4–34.8)
MCV RBC AUTO: 90.3 FL (ref 82.6–102.9)
MONOCYTES # BLD: 8 % (ref 3–12)
NRBC AUTOMATED: 0 PER 100 WBC
PDW BLD-RTO: 12.3 % (ref 11.8–14.4)
PLATELET # BLD: 191 K/UL (ref 138–453)
PMV BLD AUTO: 9.9 FL (ref 8.1–13.5)
POTASSIUM SERPL-SCNC: 4.1 MMOL/L (ref 3.7–5.3)
RBC # BLD: 4.95 M/UL (ref 4.21–5.77)
SEG NEUTROPHILS: 61 % (ref 36–65)
SEGMENTED NEUTROPHILS ABSOLUTE COUNT: 2.99 K/UL (ref 1.5–8.1)
SODIUM BLD-SCNC: 142 MMOL/L (ref 135–144)
TOTAL PROTEIN: 6.4 G/DL (ref 6.4–8.3)
TRIGLYCERIDE, FASTING: 148 MG/DL
WBC # BLD: 5 K/UL (ref 3.5–11.3)

## 2023-07-06 SDOH — ECONOMIC STABILITY: FOOD INSECURITY: WITHIN THE PAST 12 MONTHS, YOU WORRIED THAT YOUR FOOD WOULD RUN OUT BEFORE YOU GOT MONEY TO BUY MORE.: NEVER TRUE

## 2023-07-06 SDOH — ECONOMIC STABILITY: INCOME INSECURITY: HOW HARD IS IT FOR YOU TO PAY FOR THE VERY BASICS LIKE FOOD, HOUSING, MEDICAL CARE, AND HEATING?: NOT VERY HARD

## 2023-07-06 SDOH — ECONOMIC STABILITY: HOUSING INSECURITY
IN THE LAST 12 MONTHS, WAS THERE A TIME WHEN YOU DID NOT HAVE A STEADY PLACE TO SLEEP OR SLEPT IN A SHELTER (INCLUDING NOW)?: NO

## 2023-07-06 SDOH — ECONOMIC STABILITY: FOOD INSECURITY: WITHIN THE PAST 12 MONTHS, THE FOOD YOU BOUGHT JUST DIDN'T LAST AND YOU DIDN'T HAVE MONEY TO GET MORE.: NEVER TRUE

## 2023-07-06 SDOH — ECONOMIC STABILITY: TRANSPORTATION INSECURITY
IN THE PAST 12 MONTHS, HAS LACK OF TRANSPORTATION KEPT YOU FROM MEETINGS, WORK, OR FROM GETTING THINGS NEEDED FOR DAILY LIVING?: NO

## 2023-07-07 ENCOUNTER — OFFICE VISIT (OUTPATIENT)
Dept: PRIMARY CARE CLINIC | Age: 48
End: 2023-07-07
Payer: COMMERCIAL

## 2023-07-07 VITALS
DIASTOLIC BLOOD PRESSURE: 82 MMHG | SYSTOLIC BLOOD PRESSURE: 128 MMHG | WEIGHT: 181.2 LBS | BODY MASS INDEX: 25.94 KG/M2 | HEART RATE: 71 BPM | OXYGEN SATURATION: 97 % | HEIGHT: 70 IN

## 2023-07-07 DIAGNOSIS — R22.2 PALPABLE MASS OF LOWER BACK: Primary | ICD-10-CM

## 2023-07-07 PROCEDURE — G8419 CALC BMI OUT NRM PARAM NOF/U: HCPCS | Performed by: PHYSICIAN ASSISTANT

## 2023-07-07 PROCEDURE — 99213 OFFICE O/P EST LOW 20 MIN: CPT | Performed by: PHYSICIAN ASSISTANT

## 2023-07-07 PROCEDURE — 1036F TOBACCO NON-USER: CPT | Performed by: PHYSICIAN ASSISTANT

## 2023-07-07 PROCEDURE — G8427 DOCREV CUR MEDS BY ELIG CLIN: HCPCS | Performed by: PHYSICIAN ASSISTANT

## 2023-07-07 ASSESSMENT — PATIENT HEALTH QUESTIONNAIRE - PHQ9
SUM OF ALL RESPONSES TO PHQ QUESTIONS 1-9: 0
2. FEELING DOWN, DEPRESSED OR HOPELESS: 0
1. LITTLE INTEREST OR PLEASURE IN DOING THINGS: 0
SUM OF ALL RESPONSES TO PHQ9 QUESTIONS 1 & 2: 0
SUM OF ALL RESPONSES TO PHQ QUESTIONS 1-9: 0

## 2023-07-07 ASSESSMENT — ENCOUNTER SYMPTOMS
SHORTNESS OF BREATH: 0
COUGH: 0
CHEST TIGHTNESS: 0
SORE THROAT: 0
SINUS PRESSURE: 0
RHINORRHEA: 0

## 2023-07-07 NOTE — PROGRESS NOTES
[address] PRIMARY CARE  [address]  [facility] [address]  Dept: [phone]    Amanda Goss is a 50 y.o. male Established patient, who presents today for his medical conditions/complaints as noted below. Chief Complaint   Patient presents with    Back Pain     Complains of back pain        HPI:     HPI: The patient is here with concerns of some back pain for years. Usually he is able to fix this with massage but has not noticed a lump in area lower right side. Has been bothering him more lately. Reviewed prior notes None  Reviewed previous Labs    LDL Cholesterol (mg/dL)   Date Value   06/23/2023 87   03/01/2021 130     LDL Calculated (mg/dL)   Date Value   10/09/2017 68       (goal LDL is <100)   AST (U/L)   Date Value   06/23/2023 17     ALT (U/L)   Date Value   06/23/2023 23     BUN (mg/dL)   Date Value   06/23/2023 9     Hemoglobin A1C (%)   Date Value   03/01/2021 4.3     TSH (mIU/L)   Date Value   03/01/2021 1.04     BP Readings from Last 3 Encounters:   07/07/23 128/82   03/20/23 120/74   10/18/22 130/78          (goal 120/80)  Hemoglobin A1C   Date Value Ref Range Status   03/01/2021 4.3 4.0 - 6.0 % Final     Past Medical History:   Diagnosis Date    Allergic rhinitis Forever    Completed sublingual immunotherapy treatment. GERD (gastroesophageal reflux disease) 15+ years    Take Omeprazole for GERD. Hypertension       Past Surgical History:   Procedure Laterality Date    TONSILLECTOMY      VASECTOMY      WISDOM TOOTH EXTRACTION         Family History   Problem Relation Age of Onset    Cancer Mother         Thyroid    Heart Disease Father         Coronary stents       Social History     Tobacco Use    Smoking status: Never    Smokeless tobacco: Never   Substance Use Topics    Alcohol use: Yes     Alcohol/week: 14.0 standard drinks     Types: 14 Glasses of wine per week     Comment: 2 glasses of wine per day.        Current Outpatient

## 2023-07-11 ENCOUNTER — HOSPITAL ENCOUNTER (OUTPATIENT)
Dept: ULTRASOUND IMAGING | Age: 48
Discharge: HOME | End: 2023-07-13
Payer: COMMERCIAL

## 2023-07-11 DIAGNOSIS — R22.2 PALPABLE MASS OF LOWER BACK: ICD-10-CM

## 2023-07-11 PROCEDURE — 76604 US EXAM CHEST: CPT

## 2023-07-12 NOTE — RESULT ENCOUNTER NOTE
Call and advise patient that the results showed no mass in area where we were able to feel lump. This either means that testing was not in exact location or lumps were too small and similar to surrounding tissue so U/S unable to detect. May benefit from referral to general surgery to see  if lumps are able to be removed if they continue to bother him.

## 2023-07-13 ENCOUNTER — PATIENT MESSAGE (OUTPATIENT)
Dept: PRIMARY CARE CLINIC | Age: 48
End: 2023-07-13

## 2023-07-14 DIAGNOSIS — R22.2 PALPABLE MASS OF LOWER BACK: Primary | ICD-10-CM

## 2023-08-14 ENCOUNTER — HOSPITAL ENCOUNTER (OUTPATIENT)
Dept: PREADMISSION TESTING | Age: 48
Discharge: HOME | End: 2023-08-18
Payer: COMMERCIAL

## 2023-08-14 VITALS
DIASTOLIC BLOOD PRESSURE: 76 MMHG | OXYGEN SATURATION: 100 % | BODY MASS INDEX: 25.77 KG/M2 | RESPIRATION RATE: 16 BRPM | SYSTOLIC BLOOD PRESSURE: 115 MMHG | HEART RATE: 68 BPM | TEMPERATURE: 98.4 F | HEIGHT: 70 IN | WEIGHT: 180 LBS

## 2023-08-14 LAB
ANION GAP SERPL CALCULATED.3IONS-SCNC: 10 MMOL/L (ref 9–17)
BASOPHILS # BLD: 0 K/UL (ref 0–0.2)
BASOPHILS NFR BLD: 0 % (ref 0–2)
BUN SERPL-MCNC: 13 MG/DL (ref 6–20)
CALCIUM SERPL-MCNC: 9.4 MG/DL (ref 8.6–10.4)
CHLORIDE SERPL-SCNC: 103 MMOL/L (ref 98–107)
CO2 SERPL-SCNC: 28 MMOL/L (ref 20–31)
CREAT SERPL-MCNC: 0.6 MG/DL (ref 0.7–1.2)
EOSINOPHIL # BLD: 0.1 K/UL (ref 0–0.4)
EOSINOPHILS RELATIVE PERCENT: 2 % (ref 0–4)
ERYTHROCYTE [DISTWIDTH] IN BLOOD BY AUTOMATED COUNT: 12.7 % (ref 11.5–14.9)
GFR SERPL CREATININE-BSD FRML MDRD: >60 ML/MIN/1.73M2
GLUCOSE SERPL-MCNC: 85 MG/DL (ref 70–99)
HCT VFR BLD AUTO: 45 % (ref 41–53)
HGB BLD-MCNC: 15.8 G/DL (ref 13.5–17.5)
LYMPHOCYTES NFR BLD: 1.4 K/UL (ref 1–4.8)
LYMPHOCYTES RELATIVE PERCENT: 25 % (ref 24–44)
MCH RBC QN AUTO: 31.8 PG (ref 26–34)
MCHC RBC AUTO-ENTMCNC: 35.1 G/DL (ref 31–37)
MCV RBC AUTO: 90.7 FL (ref 80–100)
MONOCYTES NFR BLD: 0.4 K/UL (ref 0.1–1.3)
MONOCYTES NFR BLD: 7 % (ref 1–7)
NEUTROPHILS NFR BLD: 66 % (ref 36–66)
NEUTS SEG NFR BLD: 3.5 K/UL (ref 1.3–9.1)
PLATELET # BLD AUTO: 180 K/UL (ref 150–450)
PMV BLD AUTO: 7.4 FL (ref 6–12)
POTASSIUM SERPL-SCNC: 4.9 MMOL/L (ref 3.7–5.3)
RBC # BLD AUTO: 4.96 M/UL (ref 4.5–5.9)
SODIUM SERPL-SCNC: 141 MMOL/L (ref 135–144)
WBC OTHER # BLD: 5.4 K/UL (ref 3.5–11)

## 2023-08-14 PROCEDURE — 85025 COMPLETE CBC W/AUTO DIFF WBC: CPT

## 2023-08-14 PROCEDURE — 93005 ELECTROCARDIOGRAM TRACING: CPT | Performed by: ANESTHESIOLOGY

## 2023-08-14 PROCEDURE — 80048 BASIC METABOLIC PNL TOTAL CA: CPT

## 2023-08-14 PROCEDURE — 36415 COLL VENOUS BLD VENIPUNCTURE: CPT

## 2023-08-14 RX ORDER — FEXOFENADINE HCL 180 MG/1
180 TABLET ORAL DAILY
COMMUNITY

## 2023-08-14 ASSESSMENT — PAIN DESCRIPTION - ORIENTATION: ORIENTATION: LOWER;RIGHT

## 2023-08-14 ASSESSMENT — PAIN DESCRIPTION - LOCATION: LOCATION: BACK

## 2023-08-14 ASSESSMENT — PAIN SCALES - GENERAL: PAINLEVEL_OUTOF10: 2

## 2023-08-14 ASSESSMENT — PAIN DESCRIPTION - DESCRIPTORS: DESCRIPTORS: ACHING

## 2023-08-14 ASSESSMENT — PAIN DESCRIPTION - PAIN TYPE: TYPE: ACUTE PAIN

## 2023-08-14 NOTE — DISCHARGE INSTRUCTIONS
Pre-op Instructions For Out-Patient Surgery    Medication Instructions:  Please stop herbs and any supplements now (includes vitamins and minerals). Please contact your surgeon and prescribing physician for pre-op instructions for any blood thinners. If you have inhalers/aerosol treatments at home, please use them the morning of your surgery and bring the inhalers with you to the hospital.    Please take the following medications the morning of your surgery with a sip of water:    omeprazole    Surgery Instructions:  After midnight before surgery:  Do not eat or drink anything, including water, mints, gum, and hard candy. You may brush your teeth without swallowing. No smoking, chewing tobacco, or street drugs. Please shower or bathe before surgery. If you were given Surgical Scrub Chlorhexidine Gluconate Liquid (CHG), please shower the night before and the morning of your surgery following the detailed instructions you received during your pre-admission visit. Please do not wear any cologne, lotion, powder, deodorant, jewelry, piercings, perfume, makeup, nail polish, hair accessories, or hair spray on the day of surgery. Wear loose comfortable clothing. Leave your valuables at home. Bring a storage case for any glasses/contacts. An adult who is responsible for you MUST drive you home and should be with you for the first 24 hours after surgery. If having out-patient knee and foot surgeries, please arrange for planned crutches, walker, or wheelchair before arriving to the hospital.    The Day of Surgery:  Arrive at [facility] AT [facility] at the time directed by your surgeon and check in at the desk. If you have a living will or healthcare power of , please bring a copy. You will be taken to the pre-op holding area where you will be prepared for surgery.   A physical assessment will be performed by a nurse practitioner or house

## 2023-08-15 LAB
EKG ATRIAL RATE: 65 BPM
EKG P AXIS: 16 DEGREES
EKG P-R INTERVAL: 150 MS
EKG Q-T INTERVAL: 368 MS
EKG QRS DURATION: 90 MS
EKG QTC CALCULATION (BAZETT): 382 MS
EKG R AXIS: 32 DEGREES
EKG T AXIS: 32 DEGREES
EKG VENTRICULAR RATE: 65 BPM

## 2023-08-15 PROCEDURE — 93010 ELECTROCARDIOGRAM REPORT: CPT | Performed by: INTERNAL MEDICINE

## 2023-08-18 ENCOUNTER — ANESTHESIA EVENT (OUTPATIENT)
Dept: OPERATING ROOM | Age: 48
End: 2023-08-18
Payer: COMMERCIAL

## 2023-08-18 NOTE — PRE-PROCEDURE INSTRUCTIONS
No answer, left message ? yes                       Unable to leave message ? When were you told to arrive at hospital ?  0530    Do you have a  ? Are you on any blood thinners ? If yes when did you stop taking ? Do you have your prep Rx filled and instruction ? Nothing to eat the day before , only clear liquids. Are you experiencing any covid symptoms ? Do you have any infections or rash we should be aware of ? Do you have the Hibiclens soap to use the night before and the morning of surgery ? Nothing to eat or drink after midnight, only a sip of water to take any medication instructed to take the night before. Wear comfortable clothing, leave any valuables at home, remove any jewelry and body piercing .

## 2023-08-21 ENCOUNTER — HOSPITAL ENCOUNTER (OUTPATIENT)
Age: 48
Setting detail: OUTPATIENT SURGERY
Discharge: HOME | End: 2023-08-21
Attending: SURGERY | Admitting: SURGERY
Payer: COMMERCIAL

## 2023-08-21 ENCOUNTER — ANESTHESIA (OUTPATIENT)
Dept: OPERATING ROOM | Age: 48
End: 2023-08-21
Payer: COMMERCIAL

## 2023-08-21 VITALS
HEART RATE: 64 BPM | HEIGHT: 70 IN | BODY MASS INDEX: 25.77 KG/M2 | DIASTOLIC BLOOD PRESSURE: 71 MMHG | SYSTOLIC BLOOD PRESSURE: 113 MMHG | RESPIRATION RATE: 14 BRPM | OXYGEN SATURATION: 96 % | TEMPERATURE: 97.3 F | WEIGHT: 180 LBS

## 2023-08-21 DIAGNOSIS — D17.1 LIPOMA OF TORSO: Primary | ICD-10-CM

## 2023-08-21 PROCEDURE — 2500000003 HC RX 250 WO HCPCS: Performed by: NURSE ANESTHETIST, CERTIFIED REGISTERED

## 2023-08-21 PROCEDURE — 7100000010 HC PHASE II RECOVERY - FIRST 15 MIN: Performed by: SURGERY

## 2023-08-21 PROCEDURE — 7100000031 HC ASPR PHASE II RECOVERY - ADDTL 15 MIN: Performed by: SURGERY

## 2023-08-21 PROCEDURE — 3600000002 HC SURGERY LEVEL 2 BASE: Performed by: SURGERY

## 2023-08-21 PROCEDURE — 6360000002 HC RX W HCPCS: Performed by: NURSE ANESTHETIST, CERTIFIED REGISTERED

## 2023-08-21 PROCEDURE — 2580000003 HC RX 258: Performed by: ANESTHESIOLOGY

## 2023-08-21 PROCEDURE — C9399 UNCLASSIFIED DRUGS OR BIOLOG: HCPCS | Performed by: NURSE ANESTHETIST, CERTIFIED REGISTERED

## 2023-08-21 PROCEDURE — 7100000011 HC PHASE II RECOVERY - ADDTL 15 MIN: Performed by: SURGERY

## 2023-08-21 PROCEDURE — 7100000000 HC PACU RECOVERY - FIRST 15 MIN: Performed by: SURGERY

## 2023-08-21 PROCEDURE — 6360000002 HC RX W HCPCS: Performed by: SURGERY

## 2023-08-21 PROCEDURE — 3700000001 HC ADD 15 MINUTES (ANESTHESIA): Performed by: SURGERY

## 2023-08-21 PROCEDURE — 6370000000 HC RX 637 (ALT 250 FOR IP): Performed by: ANESTHESIOLOGY

## 2023-08-21 PROCEDURE — 88304 TISSUE EXAM BY PATHOLOGIST: CPT

## 2023-08-21 PROCEDURE — 2709999900 HC NON-CHARGEABLE SUPPLY: Performed by: SURGERY

## 2023-08-21 PROCEDURE — 7100000001 HC PACU RECOVERY - ADDTL 15 MIN: Performed by: SURGERY

## 2023-08-21 PROCEDURE — 3700000000 HC ANESTHESIA ATTENDED CARE: Performed by: SURGERY

## 2023-08-21 PROCEDURE — 7100000030 HC ASPR PHASE II RECOVERY - FIRST 15 MIN: Performed by: SURGERY

## 2023-08-21 PROCEDURE — 3600000012 HC SURGERY LEVEL 2 ADDTL 15MIN: Performed by: SURGERY

## 2023-08-21 RX ORDER — ONDANSETRON 2 MG/ML
4 INJECTION INTRAMUSCULAR; INTRAVENOUS
Status: DISCONTINUED | OUTPATIENT
Start: 2023-08-21 | End: 2023-08-21 | Stop reason: HOSPADM

## 2023-08-21 RX ORDER — KETOROLAC TROMETHAMINE 30 MG/ML
INJECTION, SOLUTION INTRAMUSCULAR; INTRAVENOUS PRN
Status: DISCONTINUED | OUTPATIENT
Start: 2023-08-21 | End: 2023-08-21 | Stop reason: SDUPTHER

## 2023-08-21 RX ORDER — SODIUM CHLORIDE 9 MG/ML
INJECTION, SOLUTION INTRAVENOUS PRN
Status: DISCONTINUED | OUTPATIENT
Start: 2023-08-21 | End: 2023-08-21 | Stop reason: HOSPADM

## 2023-08-21 RX ORDER — SODIUM CHLORIDE 0.9 % (FLUSH) 0.9 %
5-40 SYRINGE (ML) INJECTION PRN
Status: DISCONTINUED | OUTPATIENT
Start: 2023-08-21 | End: 2023-08-21 | Stop reason: HOSPADM

## 2023-08-21 RX ORDER — ONDANSETRON 2 MG/ML
INJECTION INTRAMUSCULAR; INTRAVENOUS PRN
Status: DISCONTINUED | OUTPATIENT
Start: 2023-08-21 | End: 2023-08-21 | Stop reason: SDUPTHER

## 2023-08-21 RX ORDER — MIDAZOLAM HYDROCHLORIDE 1 MG/ML
INJECTION INTRAMUSCULAR; INTRAVENOUS PRN
Status: DISCONTINUED | OUTPATIENT
Start: 2023-08-21 | End: 2023-08-21 | Stop reason: SDUPTHER

## 2023-08-21 RX ORDER — LABETALOL HYDROCHLORIDE 5 MG/ML
10 INJECTION, SOLUTION INTRAVENOUS
Status: DISCONTINUED | OUTPATIENT
Start: 2023-08-21 | End: 2023-08-21 | Stop reason: HOSPADM

## 2023-08-21 RX ORDER — ACETAMINOPHEN 325 MG/1
650 TABLET ORAL
Status: DISCONTINUED | OUTPATIENT
Start: 2023-08-21 | End: 2023-08-21 | Stop reason: HOSPADM

## 2023-08-21 RX ORDER — ACETAMINOPHEN 500 MG
1000 TABLET ORAL ONCE
Status: COMPLETED | OUTPATIENT
Start: 2023-08-21 | End: 2023-08-21

## 2023-08-21 RX ORDER — FENTANYL CITRATE 50 UG/ML
INJECTION, SOLUTION INTRAMUSCULAR; INTRAVENOUS PRN
Status: DISCONTINUED | OUTPATIENT
Start: 2023-08-21 | End: 2023-08-21 | Stop reason: SDUPTHER

## 2023-08-21 RX ORDER — PROPOFOL 10 MG/ML
INJECTION, EMULSION INTRAVENOUS PRN
Status: DISCONTINUED | OUTPATIENT
Start: 2023-08-21 | End: 2023-08-21 | Stop reason: SDUPTHER

## 2023-08-21 RX ORDER — ONDANSETRON 4 MG/1
TABLET, FILM COATED ORAL
Qty: 20 TABLET | Refills: 0 | Status: SHIPPED | OUTPATIENT
Start: 2023-08-21

## 2023-08-21 RX ORDER — LIDOCAINE HYDROCHLORIDE 10 MG/ML
INJECTION, SOLUTION EPIDURAL; INFILTRATION; INTRACAUDAL; PERINEURAL PRN
Status: DISCONTINUED | OUTPATIENT
Start: 2023-08-21 | End: 2023-08-21 | Stop reason: SDUPTHER

## 2023-08-21 RX ORDER — METOCLOPRAMIDE HYDROCHLORIDE 5 MG/ML
10 INJECTION INTRAMUSCULAR; INTRAVENOUS
Status: DISCONTINUED | OUTPATIENT
Start: 2023-08-21 | End: 2023-08-21 | Stop reason: HOSPADM

## 2023-08-21 RX ORDER — OXYCODONE HYDROCHLORIDE AND ACETAMINOPHEN 5; 325 MG/1; MG/1
1 TABLET ORAL EVERY 6 HOURS PRN
Qty: 28 TABLET | Refills: 0 | Status: SHIPPED | OUTPATIENT
Start: 2023-08-21 | End: 2023-08-28

## 2023-08-21 RX ORDER — GABAPENTIN 300 MG/1
300 CAPSULE ORAL ONCE
Status: COMPLETED | OUTPATIENT
Start: 2023-08-21 | End: 2023-08-21

## 2023-08-21 RX ORDER — ROCURONIUM BROMIDE 10 MG/ML
INJECTION, SOLUTION INTRAVENOUS PRN
Status: DISCONTINUED | OUTPATIENT
Start: 2023-08-21 | End: 2023-08-21 | Stop reason: SDUPTHER

## 2023-08-21 RX ORDER — HYDRALAZINE HYDROCHLORIDE 20 MG/ML
10 INJECTION INTRAMUSCULAR; INTRAVENOUS
Status: DISCONTINUED | OUTPATIENT
Start: 2023-08-21 | End: 2023-08-21 | Stop reason: HOSPADM

## 2023-08-21 RX ORDER — BUPIVACAINE HYDROCHLORIDE 5 MG/ML
INJECTION, SOLUTION EPIDURAL; INTRACAUDAL PRN
Status: DISCONTINUED | OUTPATIENT
Start: 2023-08-21 | End: 2023-08-21 | Stop reason: ALTCHOICE

## 2023-08-21 RX ORDER — SODIUM CHLORIDE, SODIUM LACTATE, POTASSIUM CHLORIDE, CALCIUM CHLORIDE 600; 310; 30; 20 MG/100ML; MG/100ML; MG/100ML; MG/100ML
INJECTION, SOLUTION INTRAVENOUS CONTINUOUS
Status: DISCONTINUED | OUTPATIENT
Start: 2023-08-21 | End: 2023-08-21 | Stop reason: HOSPADM

## 2023-08-21 RX ORDER — SODIUM CHLORIDE 0.9 % (FLUSH) 0.9 %
5-40 SYRINGE (ML) INJECTION EVERY 12 HOURS SCHEDULED
Status: DISCONTINUED | OUTPATIENT
Start: 2023-08-21 | End: 2023-08-21 | Stop reason: HOSPADM

## 2023-08-21 RX ORDER — MEPERIDINE HYDROCHLORIDE 25 MG/ML
12.5 INJECTION INTRAMUSCULAR; INTRAVENOUS; SUBCUTANEOUS EVERY 5 MIN PRN
Status: DISCONTINUED | OUTPATIENT
Start: 2023-08-21 | End: 2023-08-21 | Stop reason: HOSPADM

## 2023-08-21 RX ORDER — DIPHENHYDRAMINE HYDROCHLORIDE 50 MG/ML
12.5 INJECTION INTRAMUSCULAR; INTRAVENOUS
Status: DISCONTINUED | OUTPATIENT
Start: 2023-08-21 | End: 2023-08-21 | Stop reason: HOSPADM

## 2023-08-21 RX ORDER — LIDOCAINE HYDROCHLORIDE 10 MG/ML
1 INJECTION, SOLUTION EPIDURAL; INFILTRATION; INTRACAUDAL; PERINEURAL
Status: DISCONTINUED | OUTPATIENT
Start: 2023-08-21 | End: 2023-08-21 | Stop reason: HOSPADM

## 2023-08-21 RX ORDER — CEPHALEXIN 500 MG/1
CAPSULE ORAL
Qty: 21 CAPSULE | Refills: 0 | Status: SHIPPED | OUTPATIENT
Start: 2023-08-21

## 2023-08-21 RX ORDER — DEXAMETHASONE SODIUM PHOSPHATE 4 MG/ML
INJECTION, SOLUTION INTRA-ARTICULAR; INTRALESIONAL; INTRAMUSCULAR; INTRAVENOUS; SOFT TISSUE PRN
Status: DISCONTINUED | OUTPATIENT
Start: 2023-08-21 | End: 2023-08-21 | Stop reason: SDUPTHER

## 2023-08-21 RX ORDER — FENTANYL CITRATE 0.05 MG/ML
25 INJECTION, SOLUTION INTRAMUSCULAR; INTRAVENOUS EVERY 5 MIN PRN
Status: DISCONTINUED | OUTPATIENT
Start: 2023-08-21 | End: 2023-08-21 | Stop reason: HOSPADM

## 2023-08-21 RX ADMIN — SUGAMMADEX 200 MG: 100 INJECTION, SOLUTION INTRAVENOUS at 07:47

## 2023-08-21 RX ADMIN — PROPOFOL 200 MG: 10 INJECTION, EMULSION INTRAVENOUS at 07:20

## 2023-08-21 RX ADMIN — FENTANYL CITRATE 25 MCG: 50 INJECTION, SOLUTION INTRAMUSCULAR; INTRAVENOUS at 07:20

## 2023-08-21 RX ADMIN — LIDOCAINE HYDROCHLORIDE 50 MG: 10 INJECTION, SOLUTION EPIDURAL; INFILTRATION; INTRACAUDAL; PERINEURAL at 07:20

## 2023-08-21 RX ADMIN — FENTANYL CITRATE 50 MCG: 50 INJECTION, SOLUTION INTRAMUSCULAR; INTRAVENOUS at 07:35

## 2023-08-21 RX ADMIN — GABAPENTIN 300 MG: 300 CAPSULE ORAL at 06:09

## 2023-08-21 RX ADMIN — DEXAMETHASONE SODIUM PHOSPHATE 8 MG: 4 INJECTION, SOLUTION INTRAMUSCULAR; INTRAVENOUS at 07:33

## 2023-08-21 RX ADMIN — MIDAZOLAM 2 MG: 1 INJECTION INTRAMUSCULAR; INTRAVENOUS at 07:15

## 2023-08-21 RX ADMIN — FENTANYL CITRATE 25 MCG: 50 INJECTION, SOLUTION INTRAMUSCULAR; INTRAVENOUS at 07:40

## 2023-08-21 RX ADMIN — ACETAMINOPHEN 1000 MG: 500 TABLET ORAL at 06:09

## 2023-08-21 RX ADMIN — KETOROLAC TROMETHAMINE 30 MG: 30 INJECTION, SOLUTION INTRAMUSCULAR; INTRAVENOUS at 07:48

## 2023-08-21 RX ADMIN — ROCURONIUM BROMIDE 50 MG: 10 INJECTION, SOLUTION INTRAVENOUS at 07:21

## 2023-08-21 RX ADMIN — ONDANSETRON 4 MG: 2 INJECTION INTRAMUSCULAR; INTRAVENOUS at 07:46

## 2023-08-21 RX ADMIN — SODIUM CHLORIDE, POTASSIUM CHLORIDE, SODIUM LACTATE AND CALCIUM CHLORIDE: 600; 310; 30; 20 INJECTION, SOLUTION INTRAVENOUS at 06:20

## 2023-08-21 RX ADMIN — Medication 2000 MG: at 07:26

## 2023-08-21 ASSESSMENT — PAIN DESCRIPTION - DESCRIPTORS
DESCRIPTORS: ACHING;DULL
DESCRIPTORS: ACHING;DULL

## 2023-08-21 ASSESSMENT — PAIN SCALES - GENERAL: PAINLEVEL_OUTOF10: 2

## 2023-08-21 ASSESSMENT — PAIN DESCRIPTION - LOCATION: LOCATION: BACK

## 2023-08-21 ASSESSMENT — ENCOUNTER SYMPTOMS
SHORTNESS OF BREATH: 0
STRIDOR: 0

## 2023-08-21 ASSESSMENT — PAIN - FUNCTIONAL ASSESSMENT: PAIN_FUNCTIONAL_ASSESSMENT: 0-10

## 2023-08-21 ASSESSMENT — PAIN DESCRIPTION - ORIENTATION: ORIENTATION: RIGHT

## 2023-08-21 ASSESSMENT — LIFESTYLE VARIABLES: SMOKING_STATUS: 0

## 2023-08-21 NOTE — H&P
HISTORY and [id number]       NAME:  Brook Sow  MRN: [id number]   YOB: 1975   Date: 8/21/2023   Age: 50 y.o. Gender: male       COMPLAINT AND PRESENT HISTORY:       Brook Sow is 50 y.o.,  male,  here for: Pre-Op Diagnosis Codes:     * Lipoma of torso     Pt will be having:  EXCISION LESION RIGHT LOWER BACK   um bone     Patient has hx of Lipoma that was discovered  many years in last 6 mos increased pain, describes as constant dull pain. More pain with sitting and lying in bed. Rates around 1-2/10     Patient has not had similar lesions. Patient reports that the lipoma has increased in size. Patient denies any redness, itchiness, swelling or drainage from the lipoma site. Patient denies any hx skin cancer. Patient denies any injury or trauma to the area. Pt denies any hx of MRSA in the past.     Significant medical history:     Patient denies any chest pain/pressure. . Pt reports no  SOB. No recent URI. Fever or chills. Patient has been NPO since midnight. No blood thinners in the past 5-7 days. Patient took Lisinopril last night . Patient denies any personal or family problems with anesthesia. PAST MEDICAL HISTORY     Past Medical History:   Diagnosis Date    Allergic rhinitis Forever    Completed sublingual immunotherapy treatment. GERD (gastroesophageal reflux disease) 15+ years    Take Omeprazole for GERD.     Hypertension        SURGICAL HISTORY       Past Surgical History:   Procedure Laterality Date    TONSILLECTOMY      VASECTOMY      WISDOM TOOTH EXTRACTION         FAMILY HISTORY       Family History   Problem Relation Age of Onset    Cancer Mother         Thyroid    Heart Disease Father         Coronary stents       SOCIAL HISTORY       Social History     Socioeconomic History    Marital status:    Tobacco Use    Smoking status: Never    Smokeless tobacco: Never   Vaping Use    Vaping Use: Never used

## 2023-08-21 NOTE — OP NOTE
Patient: Laya English  YOB: 1975  MRN: [id number]    Date of Procedure: 8/21/2023        Preoperative diagnosis: Symptomatic lipoma right lower back    Postoperative diagnosis: Symptomatic lipoma right lower back 3 x 2 x 1 cm    Procedure: Excision symptomatic lipoma right lower back 3 x 2 x 1 cm    Surgeon: Dr. Susan Zuniga    Asst.: None    Anesthesia: General    Preparation: Chloraprep    EBL: Less than 10 mL    Specimen: Lipoma    Procedure: Informed consent was obtained. Site was marked and confirmed. Preoperative antibiotic was given. Patient was taken to the operating room. General anesthesia was given. He was placed in the prone position. Operative site was prepped and draped in usual sterile fashion. Timeout was done. Incision was made at the marked site. Incision was deepened with the help of Bovie cautery. Approximately 3 x 2 x 1 cm lipoma was enucleated. Specimen was labeled and sent to pathology. Wound was explored. Hemostasis was confirmed. Sponge needle instrument count was found to be correct. Wound was approximated using absorbable sutures. Local anesthetic was infiltrated. Steri-Strips applied. Sterile dressing was applied. Patient tolerated procedure well and was transferred to the recovery room in a stable condition.  -  Recommendations: Findings discussed with the family. Postoperative course recovery restrictions follow-up were all discussed. Prescriptions called in. Discharge instructions in the chart.

## 2023-08-21 NOTE — DISCHARGE INSTRUCTIONS
days    [x]  Resume home medications per AVS Summary    5.) Office visit: Follow up with Dr. Raymond Durand. Call to schedule an appointment if one has not been made. 6.) Call [phone] if you have any questions or concerns.     Electronically signed by Ashwini Macario MD on 8/21/2023 at 7:50 AM

## 2023-08-21 NOTE — BRIEF OP NOTE
Brief Postoperative Note      Patient: Karli Miguel  YOB: 1975  MRN: [id number]    Date of Procedure: 8/21/2023    Pre-Op Diagnosis Codes:     * Lipoma of torso [D17.1]    Post-Op Diagnosis: Same       Procedure(s):  EXCISION LESIION RIGHT LOWER BACK    Surgeon(s):  Estrella Mattson MD    Assistant:  * No surgical staff found *    Anesthesia: General    Estimated Blood Loss (mL): less than 50     Complications: None    Specimens:   * No specimens in log *    Implants:  * No implants in log *      Drains: * No LDAs found *    Findings: dictated  This procedure was not performed to treat primary cutaneous melanoma through wide local excision      Electronically signed by Estrella Mattson MD on 8/21/2023 at 7:16 AM

## 2023-08-22 LAB — SURGICAL PATHOLOGY REPORT: NORMAL

## 2023-09-22 DIAGNOSIS — I10 ESSENTIAL (PRIMARY) HYPERTENSION: ICD-10-CM

## 2023-09-22 RX ORDER — LISINOPRIL 20 MG/1
20 TABLET ORAL DAILY
Qty: 30 TABLET | Refills: 2 | Status: SHIPPED | OUTPATIENT
Start: 2023-09-22

## 2023-12-27 DIAGNOSIS — I10 ESSENTIAL (PRIMARY) HYPERTENSION: ICD-10-CM

## 2023-12-27 RX ORDER — LISINOPRIL 20 MG/1
20 TABLET ORAL DAILY
Qty: 30 TABLET | Refills: 2 | Status: SHIPPED | OUTPATIENT
Start: 2023-12-27

## 2024-03-25 DIAGNOSIS — I10 ESSENTIAL (PRIMARY) HYPERTENSION: ICD-10-CM

## 2024-03-25 RX ORDER — LISINOPRIL 20 MG/1
20 TABLET ORAL DAILY
Qty: 30 TABLET | Refills: 2 | OUTPATIENT
Start: 2024-03-25

## 2024-03-26 ENCOUNTER — OFFICE VISIT (OUTPATIENT)
Dept: PRIMARY CARE CLINIC | Age: 49
End: 2024-03-26
Payer: COMMERCIAL

## 2024-03-26 VITALS
HEART RATE: 82 BPM | OXYGEN SATURATION: 97 % | BODY MASS INDEX: 26.66 KG/M2 | WEIGHT: 186.2 LBS | DIASTOLIC BLOOD PRESSURE: 80 MMHG | SYSTOLIC BLOOD PRESSURE: 120 MMHG | HEIGHT: 70 IN

## 2024-03-26 DIAGNOSIS — I10 ESSENTIAL (PRIMARY) HYPERTENSION: Primary | ICD-10-CM

## 2024-03-26 PROCEDURE — 99213 OFFICE O/P EST LOW 20 MIN: CPT | Performed by: PHYSICIAN ASSISTANT

## 2024-03-26 PROCEDURE — G8419 CALC BMI OUT NRM PARAM NOF/U: HCPCS | Performed by: PHYSICIAN ASSISTANT

## 2024-03-26 PROCEDURE — 3074F SYST BP LT 130 MM HG: CPT | Performed by: PHYSICIAN ASSISTANT

## 2024-03-26 PROCEDURE — G8427 DOCREV CUR MEDS BY ELIG CLIN: HCPCS | Performed by: PHYSICIAN ASSISTANT

## 2024-03-26 PROCEDURE — 1036F TOBACCO NON-USER: CPT | Performed by: PHYSICIAN ASSISTANT

## 2024-03-26 PROCEDURE — G8484 FLU IMMUNIZE NO ADMIN: HCPCS | Performed by: PHYSICIAN ASSISTANT

## 2024-03-26 PROCEDURE — 3079F DIAST BP 80-89 MM HG: CPT | Performed by: PHYSICIAN ASSISTANT

## 2024-03-26 RX ORDER — LISINOPRIL 20 MG/1
20 TABLET ORAL DAILY
Qty: 30 TABLET | Refills: 5 | Status: SHIPPED | OUTPATIENT
Start: 2024-03-26

## 2024-03-26 ASSESSMENT — ENCOUNTER SYMPTOMS
ABDOMINAL DISTENTION: 0
SHORTNESS OF BREATH: 0
CONSTIPATION: 0
SORE THROAT: 0
ABDOMINAL PAIN: 0
COUGH: 0
CHEST TIGHTNESS: 0
DIARRHEA: 0

## 2024-03-26 ASSESSMENT — PATIENT HEALTH QUESTIONNAIRE - PHQ9
SUM OF ALL RESPONSES TO PHQ9 QUESTIONS 1 & 2: 0
SUM OF ALL RESPONSES TO PHQ QUESTIONS 1-9: 0
2. FEELING DOWN, DEPRESSED OR HOPELESS: NOT AT ALL
SUM OF ALL RESPONSES TO PHQ9 QUESTIONS 1 & 2: 0
SUM OF ALL RESPONSES TO PHQ QUESTIONS 1-9: 0
2. FEELING DOWN, DEPRESSED OR HOPELESS: NOT AT ALL
SUM OF ALL RESPONSES TO PHQ QUESTIONS 1-9: 0
1. LITTLE INTEREST OR PLEASURE IN DOING THINGS: NOT AT ALL
1. LITTLE INTEREST OR PLEASURE IN DOING THINGS: NOT AT ALL
SUM OF ALL RESPONSES TO PHQ QUESTIONS 1-9: 0

## 2024-03-26 NOTE — PROGRESS NOTES
Neurological:  Negative for dizziness, weakness, numbness and headaches.   Psychiatric/Behavioral:  Negative for dysphoric mood and sleep disturbance. The patient is not nervous/anxious.        Objective:     /80   Pulse 82   Ht 1.778 m (5' 10\")   Wt 84.5 kg (186 lb 3.2 oz)   SpO2 97%   BMI 26.72 kg/m²   Physical Exam  Constitutional:       General: He is not in acute distress.     Appearance: Normal appearance. He is not ill-appearing.   HENT:      Head: Normocephalic and atraumatic.      Right Ear: External ear normal.      Left Ear: External ear normal.      Nose: Nose normal.      Mouth/Throat:      Mouth: Mucous membranes are moist.   Neck:      Vascular: No carotid bruit.   Cardiovascular:      Rate and Rhythm: Normal rate and regular rhythm.      Pulses: Normal pulses.      Heart sounds: Normal heart sounds.   Pulmonary:      Effort: Pulmonary effort is normal. No respiratory distress.      Breath sounds: Normal breath sounds.   Musculoskeletal:         General: Normal range of motion.      Cervical back: Normal range of motion and neck supple.   Lymphadenopathy:      Cervical: No cervical adenopathy.   Skin:     General: Skin is warm and dry.   Neurological:      Mental Status: He is alert and oriented to person, place, and time.   Psychiatric:         Mood and Affect: Mood normal.         Behavior: Behavior normal.         Thought Content: Thought content normal.         Assessment and Plan:          1. Essential (primary) hypertension  -     lisinopril (PRINIVIL;ZESTRIL) 20 MG tablet; Take 1 tablet by mouth daily, Disp-30 tablet, R-5Normal           Return in about 6 months (around 9/26/2024) for Hypertention.     Patient given educational materials - see patient instructions.  Discussed use, benefit, and side effects of prescribed medications.  All patient questions answered. Pt voiced understanding. Reviewed health maintenance.  Instructed to continue current medications, diet and exercise.

## 2024-09-24 SDOH — ECONOMIC STABILITY: FOOD INSECURITY: WITHIN THE PAST 12 MONTHS, YOU WORRIED THAT YOUR FOOD WOULD RUN OUT BEFORE YOU GOT MONEY TO BUY MORE.: NEVER TRUE

## 2024-09-24 SDOH — ECONOMIC STABILITY: FOOD INSECURITY: WITHIN THE PAST 12 MONTHS, THE FOOD YOU BOUGHT JUST DIDN'T LAST AND YOU DIDN'T HAVE MONEY TO GET MORE.: NEVER TRUE

## 2024-09-24 SDOH — ECONOMIC STABILITY: INCOME INSECURITY: HOW HARD IS IT FOR YOU TO PAY FOR THE VERY BASICS LIKE FOOD, HOUSING, MEDICAL CARE, AND HEATING?: NOT HARD AT ALL

## 2024-09-26 ENCOUNTER — OFFICE VISIT (OUTPATIENT)
Dept: PRIMARY CARE CLINIC | Age: 49
End: 2024-09-26

## 2024-09-26 VITALS
HEART RATE: 75 BPM | OXYGEN SATURATION: 98 % | SYSTOLIC BLOOD PRESSURE: 120 MMHG | BODY MASS INDEX: 26.49 KG/M2 | WEIGHT: 184.6 LBS | DIASTOLIC BLOOD PRESSURE: 80 MMHG

## 2024-09-26 DIAGNOSIS — Z23 NEED FOR VACCINATION: ICD-10-CM

## 2024-09-26 DIAGNOSIS — K21.9 GASTROESOPHAGEAL REFLUX DISEASE WITHOUT ESOPHAGITIS: ICD-10-CM

## 2024-09-26 DIAGNOSIS — I10 ESSENTIAL (PRIMARY) HYPERTENSION: Primary | ICD-10-CM

## 2024-09-26 DIAGNOSIS — J30.2 SEASONAL ALLERGIES: ICD-10-CM

## 2024-09-26 ASSESSMENT — ENCOUNTER SYMPTOMS
COUGH: 0
CONSTIPATION: 0
ABDOMINAL PAIN: 0
SHORTNESS OF BREATH: 0
CHEST TIGHTNESS: 0
SORE THROAT: 0
ABDOMINAL DISTENTION: 0
DIARRHEA: 0

## 2024-10-07 ENCOUNTER — PATIENT MESSAGE (OUTPATIENT)
Dept: PRIMARY CARE CLINIC | Age: 49
End: 2024-10-07

## 2024-10-07 DIAGNOSIS — I10 ESSENTIAL (PRIMARY) HYPERTENSION: ICD-10-CM

## 2024-10-08 RX ORDER — LISINOPRIL 20 MG/1
20 TABLET ORAL DAILY
Qty: 30 TABLET | Refills: 5 | Status: SHIPPED | OUTPATIENT
Start: 2024-10-08

## 2024-10-31 ENCOUNTER — OFFICE VISIT (OUTPATIENT)
Dept: PRIMARY CARE CLINIC | Age: 49
End: 2024-10-31

## 2024-10-31 VITALS
WEIGHT: 187 LBS | SYSTOLIC BLOOD PRESSURE: 118 MMHG | HEART RATE: 79 BPM | DIASTOLIC BLOOD PRESSURE: 70 MMHG | HEIGHT: 70 IN | BODY MASS INDEX: 26.77 KG/M2 | OXYGEN SATURATION: 97 %

## 2024-10-31 DIAGNOSIS — J01.90 ACUTE NON-RECURRENT SINUSITIS, UNSPECIFIED LOCATION: ICD-10-CM

## 2024-10-31 DIAGNOSIS — J30.2 SEASONAL ALLERGIES: Primary | ICD-10-CM

## 2024-10-31 DIAGNOSIS — J32.1 CHRONIC FRONTAL SINUSITIS: ICD-10-CM

## 2024-10-31 DIAGNOSIS — I10 ESSENTIAL (PRIMARY) HYPERTENSION: ICD-10-CM

## 2024-10-31 RX ORDER — METHYLPREDNISOLONE ACETATE 80 MG/ML
80 INJECTION, SUSPENSION INTRA-ARTICULAR; INTRALESIONAL; INTRAMUSCULAR; SOFT TISSUE ONCE
Status: COMPLETED | OUTPATIENT
Start: 2024-10-31 | End: 2024-10-31

## 2024-10-31 RX ADMIN — METHYLPREDNISOLONE ACETATE 80 MG: 80 INJECTION, SUSPENSION INTRA-ARTICULAR; INTRALESIONAL; INTRAMUSCULAR; SOFT TISSUE at 14:07

## 2024-10-31 ASSESSMENT — PATIENT HEALTH QUESTIONNAIRE - PHQ9
2. FEELING DOWN, DEPRESSED OR HOPELESS: NOT AT ALL
SUM OF ALL RESPONSES TO PHQ QUESTIONS 1-9: 0
1. LITTLE INTEREST OR PLEASURE IN DOING THINGS: NOT AT ALL
SUM OF ALL RESPONSES TO PHQ QUESTIONS 1-9: 0
SUM OF ALL RESPONSES TO PHQ9 QUESTIONS 1 & 2: 0
SUM OF ALL RESPONSES TO PHQ QUESTIONS 1-9: 0
SUM OF ALL RESPONSES TO PHQ QUESTIONS 1-9: 0
DEPRESSION UNABLE TO ASSESS: FUNCTIONAL CAPACITY MOTIVATION LIMITS ACCURACY

## 2024-10-31 ASSESSMENT — ENCOUNTER SYMPTOMS
CONSTIPATION: 0
CHEST TIGHTNESS: 0
SORE THROAT: 0
ABDOMINAL DISTENTION: 0
ABDOMINAL PAIN: 0
COUGH: 1
DIARRHEA: 0
SHORTNESS OF BREATH: 0
RHINORRHEA: 1

## 2024-10-31 NOTE — PROGRESS NOTES
[facility]  [facility]  [address]  [place] [address]  Dept: [phone]    Michael Logan is a 49 y.o. male Established patient, who presents today for his medical conditions/complaints as noted below.      Chief Complaint   Patient presents with    Sinus Problem     8+ days, change of weather, cleaned out garage, not congestion or sore throat, has drainage       HPI:     HPI: The patient is a pleasant 49-year-old male presents today with concerns of a plus days of sinus issue cleaned out the garage and weather change has caused him to have lots of drainage.  Patient takes Flonase and Allegra.  No congestion or sore throat.  No fevers.    Reviewed prior notes None  Reviewed previous Labs    No components found for: \"LDLCHOLESTEROL\", \"LDLCALC\"    (goal LDL is <100)   AST (U/L)   Date Value   06/23/2023 17     ALT (U/L)   Date Value   06/23/2023 23     BUN (mg/dL)   Date Value   08/14/2023 13     Hemoglobin A1C (%)   Date Value   03/01/2021 4.3     TSH (mIU/L)   Date Value   03/01/2021 1.04     BP Readings from Last 3 Encounters:   10/31/24 118/70   09/26/24 120/80   03/26/24 120/80          (goal 120/80)  Hemoglobin A1C   Date Value Ref Range Status   03/01/2021 4.3 4.0 - 6.0 % Final     Past Medical History:   Diagnosis Date    Allergic rhinitis Forever    Completed sublingual immunotherapy treatment.    GERD (gastroesophageal reflux disease) 15+ years    Take Omeprazole for GERD.    Hypertension       Past Surgical History:   Procedure Laterality Date    SKIN LESION EXCISION Right 8/21/2023    EXCISION LESIION RIGHT LOWER BACK performed by Jorge Cruz MD at [facility] OR    TONSILLECTOMY      VASECTOMY      WISDOM TOOTH EXTRACTION         Family History   Problem Relation Age of Onset    Cancer Mother         Thyroid    Heart Disease Father         Coronary stents       Social History     Tobacco Use    Smoking status: Never    Smokeless tobacco: Never   Substance

## 2025-01-27 ENCOUNTER — OFFICE VISIT (OUTPATIENT)
Dept: PRIMARY CARE CLINIC | Age: 50
End: 2025-01-27
Payer: COMMERCIAL

## 2025-01-27 VITALS
DIASTOLIC BLOOD PRESSURE: 80 MMHG | HEIGHT: 70 IN | SYSTOLIC BLOOD PRESSURE: 138 MMHG | WEIGHT: 190 LBS | BODY MASS INDEX: 27.2 KG/M2 | OXYGEN SATURATION: 98 % | HEART RATE: 79 BPM

## 2025-01-27 DIAGNOSIS — J01.10 ACUTE NON-RECURRENT FRONTAL SINUSITIS: ICD-10-CM

## 2025-01-27 DIAGNOSIS — K21.9 GASTROESOPHAGEAL REFLUX DISEASE WITHOUT ESOPHAGITIS: ICD-10-CM

## 2025-01-27 DIAGNOSIS — I10 ESSENTIAL (PRIMARY) HYPERTENSION: Primary | ICD-10-CM

## 2025-01-27 PROCEDURE — G8427 DOCREV CUR MEDS BY ELIG CLIN: HCPCS | Performed by: PHYSICIAN ASSISTANT

## 2025-01-27 PROCEDURE — 3075F SYST BP GE 130 - 139MM HG: CPT | Performed by: PHYSICIAN ASSISTANT

## 2025-01-27 PROCEDURE — 99214 OFFICE O/P EST MOD 30 MIN: CPT | Performed by: PHYSICIAN ASSISTANT

## 2025-01-27 PROCEDURE — G2211 COMPLEX E/M VISIT ADD ON: HCPCS | Performed by: PHYSICIAN ASSISTANT

## 2025-01-27 PROCEDURE — 3079F DIAST BP 80-89 MM HG: CPT | Performed by: PHYSICIAN ASSISTANT

## 2025-01-27 PROCEDURE — G8419 CALC BMI OUT NRM PARAM NOF/U: HCPCS | Performed by: PHYSICIAN ASSISTANT

## 2025-01-27 PROCEDURE — 1036F TOBACCO NON-USER: CPT | Performed by: PHYSICIAN ASSISTANT

## 2025-01-27 RX ORDER — AZELASTINE HCL 205.5 UG/1
SPRAY NASAL
COMMUNITY
Start: 2024-09-01

## 2025-01-27 SDOH — ECONOMIC STABILITY: FOOD INSECURITY: WITHIN THE PAST 12 MONTHS, THE FOOD YOU BOUGHT JUST DIDN'T LAST AND YOU DIDN'T HAVE MONEY TO GET MORE.: NEVER TRUE

## 2025-01-27 SDOH — ECONOMIC STABILITY: FOOD INSECURITY: WITHIN THE PAST 12 MONTHS, YOU WORRIED THAT YOUR FOOD WOULD RUN OUT BEFORE YOU GOT MONEY TO BUY MORE.: NEVER TRUE

## 2025-01-27 ASSESSMENT — ENCOUNTER SYMPTOMS
VOMITING: 0
SORE THROAT: 0
SINUS PRESSURE: 1
CONSTIPATION: 0
DIARRHEA: 0
CHEST TIGHTNESS: 0
SHORTNESS OF BREATH: 0
COUGH: 0
NAUSEA: 0

## 2025-01-27 ASSESSMENT — PATIENT HEALTH QUESTIONNAIRE - PHQ9
SUM OF ALL RESPONSES TO PHQ QUESTIONS 1-9: 0
1. LITTLE INTEREST OR PLEASURE IN DOING THINGS: NOT AT ALL
2. FEELING DOWN, DEPRESSED OR HOPELESS: NOT AT ALL
SUM OF ALL RESPONSES TO PHQ QUESTIONS 1-9: 0
DEPRESSION UNABLE TO ASSESS: FUNCTIONAL CAPACITY MOTIVATION LIMITS ACCURACY
SUM OF ALL RESPONSES TO PHQ QUESTIONS 1-9: 0
SUM OF ALL RESPONSES TO PHQ QUESTIONS 1-9: 0
SUM OF ALL RESPONSES TO PHQ9 QUESTIONS 1 & 2: 0

## 2025-01-27 NOTE — PROGRESS NOTES
vaccine  Completed   • COVID-19 Vaccine  Completed   • Hepatitis C screen  Completed   • HIV screen  Completed   • Hepatitis A vaccine  Aged Out   • Hib vaccine  Aged Out   • Polio vaccine  Aged Out   • Meningococcal (ACWY) vaccine  Aged Out   • Pneumococcal 0-64 years Vaccine  Aged Out   • Diabetes screen  Discontinued       Subjective:      Review of Systems   Constitutional:  Negative for chills, fever and unexpected weight change.   HENT:  Positive for congestion and sinus pressure. Negative for sore throat.    Respiratory:  Negative for cough, chest tightness and shortness of breath.    Cardiovascular:  Negative for chest pain, palpitations and leg swelling.   Gastrointestinal:  Negative for constipation, diarrhea, nausea and vomiting.   Musculoskeletal:  Negative for arthralgias.   Skin:  Negative for rash.   Neurological:  Positive for headaches. Negative for dizziness, weakness and numbness.   Psychiatric/Behavioral:  Negative for dysphoric mood and sleep disturbance. The patient is not nervous/anxious.        Objective:     /80   Pulse 79   Ht 1.778 m (5' 10\")   Wt 86.2 kg (190 lb)   SpO2 98%   BMI 27.26 kg/m²   Physical Exam  Constitutional:       General: He is not in acute distress.     Appearance: Normal appearance. He is not ill-appearing.   HENT:      Head: Normocephalic and atraumatic.      Right Ear: External ear normal.      Left Ear: External ear normal.      Nose: Nose normal.      Mouth/Throat:      Mouth: Mucous membranes are moist.   Neck:      Vascular: No carotid bruit.   Cardiovascular:      Rate and Rhythm: Normal rate and regular rhythm.      Pulses: Normal pulses.      Heart sounds: Normal heart sounds.   Pulmonary:      Effort: Pulmonary effort is normal. No respiratory distress.      Breath sounds: Normal breath sounds.   Musculoskeletal:         General: Normal range of motion.      Cervical back: Normal range of motion and neck supple.   Lymphadenopathy:      Cervical: No  present

## 2025-03-05 DIAGNOSIS — I10 ESSENTIAL (PRIMARY) HYPERTENSION: ICD-10-CM

## 2025-03-05 DIAGNOSIS — K21.9 GASTROESOPHAGEAL REFLUX DISEASE WITHOUT ESOPHAGITIS: ICD-10-CM

## 2025-03-05 DIAGNOSIS — J30.2 SEASONAL ALLERGIES: ICD-10-CM

## 2025-03-06 LAB
ALBUMIN/GLOBULIN RATIO: 2
ALBUMIN: 4.4 G/DL
ALP BLD-CCNC: 84 U/L
ALT SERPL-CCNC: 36 U/L
ANION GAP SERPL CALCULATED.3IONS-SCNC: 9 MMOL/L
AST SERPL-CCNC: 21 U/L
BASOPHILS ABSOLUTE: 0.03 K/UL (ref 0–0.2)
BASOPHILS RELATIVE PERCENT: 1 %
BILIRUB SERPL-MCNC: 1 MG/DL
BUN BLDV-MCNC: 10 MG/DL
CALCIUM SERPL-MCNC: 9 MG/DL
CHLORIDE BLD-SCNC: 104 MMOL/L (ref 98–107)
CHOLESTEROL, FASTING: 182 MG/DL
CHOLESTEROL/HDL RATIO: 5.4
CO2: 27 MMOL/L (ref 20–31)
CREAT SERPL-MCNC: 0.9 MG/DL
EOSINOPHILS ABSOLUTE: 0.11 K/UL
EOSINOPHILS RELATIVE PERCENT: 2 %
GFR, ESTIMATED: >90 ML/MIN/1.73M2
GLUCOSE BLD-MCNC: 95 MG/DL
HCT VFR BLD CALC: 45 %
HDLC SERPL-MCNC: 34 MG/DL
HEMOGLOBIN: 15.9 G/DL
IMMATURE GRANULOCYTES %: 0 %
IMMATURE GRANULOCYTES ABSOLUTE: <0.03 K/UL (ref 0–0.3)
LDL CHOLESTEROL: 111 MG/DL
LYMPHOCYTES ABSOLUTE: 1.44 K/UL
LYMPHOCYTES RELATIVE PERCENT: 26 %
MCH RBC QN AUTO: 31.7 PG
MCHC RBC AUTO-ENTMCNC: 35.3 G/DL
MCV RBC AUTO: 89.8 FL
MONOCYTES ABSOLUTE: 0.41 K/UL
MONOCYTES RELATIVE PERCENT: 7 %
NEUTROPHILS ABSOLUTE: 3.58 K/UL
NEUTROPHILS RELATIVE PERCENT: 64 %
NRBC AUTOMATED: 0 PER 100 WBC
PDW BLD-RTO: 12.1 %
PLATELET # BLD: 213 K/UL
PMV BLD AUTO: 9.5 FL
POTASSIUM SERPL-SCNC: 4.4 MMOL/L (ref 3.7–5.3)
RBC # BLD: 5.01 M/UL
SODIUM BLD-SCNC: 140 MMOL/L (ref 136–145)
TOTAL PROTEIN: 6.6 G/DL
TRIGLYCERIDE, FASTING: 187 MG/DL
VLDLC SERPL CALC-MCNC: 37 MG/DL
WBC # BLD: 5.6 K/UL

## 2025-03-11 ENCOUNTER — OFFICE VISIT (OUTPATIENT)
Dept: PRIMARY CARE CLINIC | Age: 50
End: 2025-03-11
Payer: COMMERCIAL

## 2025-03-11 VITALS
BODY MASS INDEX: 27.2 KG/M2 | DIASTOLIC BLOOD PRESSURE: 72 MMHG | HEIGHT: 70 IN | OXYGEN SATURATION: 97 % | WEIGHT: 190 LBS | HEART RATE: 78 BPM | SYSTOLIC BLOOD PRESSURE: 128 MMHG

## 2025-03-11 DIAGNOSIS — Z12.5 SCREENING FOR PROSTATE CANCER: ICD-10-CM

## 2025-03-11 DIAGNOSIS — J30.2 SEASONAL ALLERGIES: ICD-10-CM

## 2025-03-11 DIAGNOSIS — I10 ESSENTIAL (PRIMARY) HYPERTENSION: Primary | ICD-10-CM

## 2025-03-11 DIAGNOSIS — K21.9 GASTROESOPHAGEAL REFLUX DISEASE WITHOUT ESOPHAGITIS: ICD-10-CM

## 2025-03-11 PROCEDURE — G2211 COMPLEX E/M VISIT ADD ON: HCPCS | Performed by: PHYSICIAN ASSISTANT

## 2025-03-11 PROCEDURE — G8419 CALC BMI OUT NRM PARAM NOF/U: HCPCS | Performed by: PHYSICIAN ASSISTANT

## 2025-03-11 PROCEDURE — 99214 OFFICE O/P EST MOD 30 MIN: CPT | Performed by: PHYSICIAN ASSISTANT

## 2025-03-11 PROCEDURE — 3074F SYST BP LT 130 MM HG: CPT | Performed by: PHYSICIAN ASSISTANT

## 2025-03-11 PROCEDURE — 1036F TOBACCO NON-USER: CPT | Performed by: PHYSICIAN ASSISTANT

## 2025-03-11 PROCEDURE — G8427 DOCREV CUR MEDS BY ELIG CLIN: HCPCS | Performed by: PHYSICIAN ASSISTANT

## 2025-03-11 PROCEDURE — 3078F DIAST BP <80 MM HG: CPT | Performed by: PHYSICIAN ASSISTANT

## 2025-03-11 RX ORDER — LISINOPRIL 20 MG/1
20 TABLET ORAL DAILY
Qty: 30 TABLET | Refills: 5 | Status: SHIPPED | OUTPATIENT
Start: 2025-03-11

## 2025-03-11 SDOH — ECONOMIC STABILITY: FOOD INSECURITY: WITHIN THE PAST 12 MONTHS, YOU WORRIED THAT YOUR FOOD WOULD RUN OUT BEFORE YOU GOT MONEY TO BUY MORE.: NEVER TRUE

## 2025-03-11 SDOH — ECONOMIC STABILITY: FOOD INSECURITY: WITHIN THE PAST 12 MONTHS, THE FOOD YOU BOUGHT JUST DIDN'T LAST AND YOU DIDN'T HAVE MONEY TO GET MORE.: NEVER TRUE

## 2025-03-11 ASSESSMENT — ENCOUNTER SYMPTOMS
SORE THROAT: 0
DIARRHEA: 0
ABDOMINAL DISTENTION: 0
ABDOMINAL PAIN: 0
CONSTIPATION: 0
SHORTNESS OF BREATH: 0
CHEST TIGHTNESS: 0
COUGH: 0

## 2025-03-11 ASSESSMENT — PATIENT HEALTH QUESTIONNAIRE - PHQ9
SUM OF ALL RESPONSES TO PHQ QUESTIONS 1-9: 0
2. FEELING DOWN, DEPRESSED OR HOPELESS: NOT AT ALL
DEPRESSION UNABLE TO ASSESS: FUNCTIONAL CAPACITY MOTIVATION LIMITS ACCURACY
1. LITTLE INTEREST OR PLEASURE IN DOING THINGS: NOT AT ALL
SUM OF ALL RESPONSES TO PHQ QUESTIONS 1-9: 0

## 2025-03-11 NOTE — PROGRESS NOTES
[facility]  [facility]  [address]  [place] [address]  Dept: [phone]    Michael Logan is a 49 y.o. male Established patient, who presents today for his medical conditions/complaints as noted below.      Chief Complaint   Patient presents with    Medication Refill     Patient states they no current concerns.        HPI:     History of Present Illness  The patient is a pleasant 49-year-old male who presents today for a medication check. He has a significant past medical history of hypertension and GERD and is currently taking Allegra, azelastine, Flonase, omeprazole, and lisinopril.    He reports no new health concerns since his last visit and confirms that his current medications are effective. He monitors his blood pressure intermittently at home, with readings typically ranging from 120 to 125 systolic and around 80 diastolic. He is not experiencing any chest pain, palpitations, dizziness, or headaches. He also reports no significant changes in vision and continues to wear glasses. He maintains regular appointments with his ophthalmologist.    His allergies are well-managed with his current regimen. He recalls his last visit was due to a sinus infection, which he believes originated from a cold. He occasionally experiences colds that can progress to sinus infections, but these are generally well-managed. He tolerates his allergy symptoms and finds his current treatment plan effective.    FAMILY HISTORY  His wife has high blood pressure.    MEDICATIONS  Current: Allegra, azelastine, Flonase, omeprazole, lisinopril      Reviewed prior notes: None  Reviewed previous: Labs    LDL Cholesterol (mg/dL)   Date Value   03/05/2025 111     LDL Calculated (mg/dL)   Date Value   10/09/2017 68     HDL (mg/dL)   Date Value   03/05/2025 34       (goal LDL is <100)   AST (U/L)   Date Value   03/05/2025 21     ALT (U/L)   Date Value   03/05/2025 36     BUN (mg/dL)   Date Value

## 2025-08-25 ENCOUNTER — PATIENT MESSAGE (OUTPATIENT)
Dept: PRIMARY CARE CLINIC | Age: 50
End: 2025-08-25

## 2025-08-25 DIAGNOSIS — M79.671 PAIN OF BOTH HEELS: Primary | ICD-10-CM

## 2025-08-25 DIAGNOSIS — M79.672 PAIN OF BOTH HEELS: Primary | ICD-10-CM

## 2025-08-25 DIAGNOSIS — M24.559 HIP FLEXOR TIGHTNESS, UNSPECIFIED LATERALITY: ICD-10-CM

## 2025-08-26 DIAGNOSIS — Z12.5 SCREENING FOR PROSTATE CANCER: ICD-10-CM

## 2025-08-26 LAB — PROSTATE SPECIFIC ANTIGEN: 1.23 NG/ML (ref 0–4)

## 2025-08-27 ENCOUNTER — RESULTS FOLLOW-UP (OUTPATIENT)
Dept: PRIMARY CARE CLINIC | Age: 50
End: 2025-08-27

## 2025-09-02 ENCOUNTER — HOSPITAL ENCOUNTER (OUTPATIENT)
Dept: PHYSICAL THERAPY | Facility: CLINIC | Age: 50
Setting detail: THERAPIES SERIES
Discharge: HOME | End: 2025-09-02
Payer: COMMERCIAL

## 2025-09-02 PROCEDURE — 97161 PT EVAL LOW COMPLEX 20 MIN: CPT

## 2025-09-02 PROCEDURE — 97110 THERAPEUTIC EXERCISES: CPT

## (undated) DEVICE — SUTURE VCRL + SZ 3-0 L27IN ABSRB UD L26MM SH 1/2 CIR VCP416H

## (undated) DEVICE — SOLUTION IRRIG 1000ML STRL H2O USP PLAS POUR BTL

## (undated) DEVICE — GOWN,AURORA,NONREINFORCED,LARGE: Brand: MEDLINE

## (undated) DEVICE — PAD,NON-ADHERENT,3X8,STERILE,LF,1/PK: Brand: MEDLINE

## (undated) DEVICE — GLOVE ORTHO 7 1/2   MSG9475

## (undated) DEVICE — SINGLE PORT MANIFOLD: Brand: NEPTUNE 2

## (undated) DEVICE — ST CHARLES MINOR ABDOMINAL PK: Brand: MEDLINE INDUSTRIES, INC.

## (undated) DEVICE — SUTURE VCRL + SZ 2-0 L27IN ABSRB UD CT-2 L26MM 1/2 CIR TAPR VCP269H

## (undated) DEVICE — SOLUTION IRRIG 1000ML 0.9% SOD CHL USP POUR PLAS BTL

## (undated) DEVICE — MERCY HEALTH ST CHARLES: Brand: MEDLINE INDUSTRIES, INC.

## (undated) DEVICE — SHEET, T, LAPAROTOMY, STERILE: Brand: MEDLINE